# Patient Record
Sex: FEMALE | Race: WHITE | NOT HISPANIC OR LATINO | Employment: FULL TIME | URBAN - METROPOLITAN AREA
[De-identification: names, ages, dates, MRNs, and addresses within clinical notes are randomized per-mention and may not be internally consistent; named-entity substitution may affect disease eponyms.]

---

## 2017-01-23 ENCOUNTER — OFFICE VISIT (OUTPATIENT)
Dept: BARIATRICS/WEIGHT MGMT | Facility: CLINIC | Age: 47
End: 2017-01-23

## 2017-01-23 VITALS
HEIGHT: 65 IN | RESPIRATION RATE: 16 BRPM | BODY MASS INDEX: 42.32 KG/M2 | TEMPERATURE: 98.4 F | WEIGHT: 254 LBS | HEART RATE: 107 BPM | DIASTOLIC BLOOD PRESSURE: 92 MMHG | SYSTOLIC BLOOD PRESSURE: 140 MMHG

## 2017-01-23 DIAGNOSIS — Z71.3 DIETARY COUNSELING: ICD-10-CM

## 2017-01-23 DIAGNOSIS — R60.9 EDEMA, UNSPECIFIED TYPE: ICD-10-CM

## 2017-01-23 DIAGNOSIS — R53.82 CHRONIC FATIGUE: ICD-10-CM

## 2017-01-23 DIAGNOSIS — G47.33 OSA (OBSTRUCTIVE SLEEP APNEA): ICD-10-CM

## 2017-01-23 DIAGNOSIS — K21.9 GASTROESOPHAGEAL REFLUX DISEASE, ESOPHAGITIS PRESENCE NOT SPECIFIED: ICD-10-CM

## 2017-01-23 DIAGNOSIS — Z72.0 TOBACCO ABUSE: ICD-10-CM

## 2017-01-23 DIAGNOSIS — E66.01 OBESITY, CLASS III, BMI 40-49.9 (MORBID OBESITY) (HCC): Primary | ICD-10-CM

## 2017-01-23 DIAGNOSIS — M25.50 MULTIPLE JOINT PAIN: ICD-10-CM

## 2017-01-23 PROCEDURE — 99212 OFFICE O/P EST SF 10 MIN: CPT | Performed by: NURSE PRACTITIONER

## 2017-01-23 NOTE — PROGRESS NOTES
[facility] BARIATRIC SURGERY  [address]  [facility] [address]  [address]  [facility] [address]  Dept: [phone]  1/23/2017      Leena Mims.  [id number]  [id number]  1970  female      Chief Complaint   Patient presents with   • Obesity     Diet management 6/9       The patient is here for month 6 of their pre-operative physician supervised diet. She had a loss of 6 lbs. The patient states that she is following the recommendations given by our office and dietician including a high lean protein, low carb and low fat diet. We recommended adequate fruits and vegetable intake along with limited portion sizes. Patient is working on eliminating fast foods, fried foods, sweets and soda. Leena Mims has been increasing her daily water intake. She has been exercising: walking in the evening.    Patient states they have made positive changes including cut bread completely out, drinking less coffee and more water, not skipping meals, eating some lean protein/vegetables/fruit, cut back on potatoes/chips/poor snack choices, trying to limit snacking  The patient admits to be struggling with figuring out lunch options that isn't using bread    Review of Systems   Musculoskeletal: Positive for arthralgias.   All other systems reviewed and are negative.    Vitals:    01/23/17 1336   BP: 140/92   Pulse: 107   Resp: 16   Temp: 98.4 °F (36.9 °C)     Patient Active Problem List   Diagnosis   • Obesity, Class III, BMI 40-49.9 (morbid obesity)   • SULEMA (obstructive sleep apnea)   • GERD (gastroesophageal reflux disease)   • Chronic fatigue   • Edema   • Gastric ulcer   • Multiple joint pain   • Depression with anxiety   • Asthma   • Tobacco abuse   • Dietary counseling   • IBS (irritable bowel syndrome)       The following portions of the patient's history were reviewed and updated as appropriate: active problem list, medication list, allergies, social  history, notes from last encounter    Physical Exam   Constitutional: She is oriented to person, place, and time. She appears well-developed and well-nourished.   HENT:   Head: Normocephalic and atraumatic.   Eyes: EOM are normal.   Cardiovascular: Normal rate.    Pulmonary/Chest: Effort normal.   Musculoskeletal: Normal range of motion.   Neurological: She is alert and oriented to person, place, and time.   Skin: Skin is warm and dry.   Psychiatric: She has a normal mood and affect. Her behavior is normal. Judgment and thought content normal.   Vitals reviewed.      Discussion/Plan:  Obesity/Morbid Obesity: Currently the patient's weight is decreasing. There are no medications prescribed.Treatment plan includes prescribed diet, prescribed exercise regimen and behavior modification.    I reviewed the appropriate dietary choices with the patient and encouraged the necessary changes. Recommended at least 70 grams of protein per day, around 35 grams of fats and less than 100 grams of carbohydrates. Reviewed calorie intake if patient wanted to calorie count and/or had BMR. Instructed patient to drink half of body weight in ounces per day and exercise a minimum of 150 minutes per week including both cardio and strength training. Discussed the option of keeping a food journal which will help patient become more aware of the nutritional value of foods so they are more prepared after surgery.    Patient is down to 1/2ppd but just bought a e-cig to help be able to stop smoking.     The patient was given written materials from our office for education.   I answered all of the patients questions regarding dietary changes, exercise or surgical options.  The patient will follow up in 1 month. The total time spent face to face was 12 minutes with more than half our time was spent counseling.

## 2017-01-23 NOTE — MR AVS SNAPSHOT
Leena Mims   1/23/2017 1:30 PM   Office Visit    Dept Phone:  [phone]   Encounter #:  [id number]    Provider:  ROXY Campbell   Department:  [facility] BARIATRIC SURGERY                Your Full Care Plan              Today's Medication Changes          These changes are accurate as of: 1/23/17  2:10 PM.  If you have any questions, ask your nurse or doctor.               Stop taking medication(s)listed here:     TRAMADOL HCL PO                      Your Updated Medication List          This list is accurate as of: 1/23/17  2:10 PM.  Always use your most recent med list.                ALBUTEROL IN       budesonide-formoterol 160-4.5 MCG/ACT inhaler   Commonly known as:  SYMBICORT       cyclobenzaprine 10 MG tablet   Commonly known as:  FLEXERIL       dicyclomine 10 MG capsule   Commonly known as:  BENTYL       gabapentin 300 MG capsule   Commonly known as:  NEURONTIN       IBUPROFEN PO       montelukast 10 MG tablet   Commonly known as:  SINGULAIR       naproxen sodium 220 MG tablet   Commonly known as:  ALEVE       omeprazole 20 MG capsule   Commonly known as:  priLOSEC   Take 1 capsule by mouth Daily.       OXAPROZIN PO               You Were Diagnosed With        Codes Comments    Obesity, Class III, BMI 40-49.9 (morbid obesity)    -  Primary ICD-10-CM: E66.01  ICD-9-CM: 278.01     Dietary counseling     ICD-10-CM: Z71.3  ICD-9-CM: V65.3     SULEMA (obstructive sleep apnea)     ICD-10-CM: G47.33  ICD-9-CM: 327.23     Gastroesophageal reflux disease, esophagitis presence not specified     ICD-10-CM: K21.9  ICD-9-CM: 530.81     Multiple joint pain     ICD-10-CM: M25.50  ICD-9-CM: 719.49     Chronic fatigue     ICD-10-CM: R53.82  ICD-9-CM: 780.79     Edema, unspecified type     ICD-10-CM: R60.9  ICD-9-CM: 782.3     Tobacco abuse     ICD-10-CM: Z72.0  ICD-9-CM: 305.1       Instructions    Recommended patient be sure to get at least 70 grams of protein per day by  "eating small, frequent meals all with high lean protein choices. Be sure to limit/cut back on daily carbohydrate intake. Discussed with the patient the recommended amount of water per day to intake. Reviewed vitamin requirements. Be sure to do routine exercise including both cardio and strength training.        Patient Instructions History      Upcoming Appointments     Visit Type Date Time Department    FOLLOW UP 1/23/2017  1:30 PM [facility]      MyChart Signup     Our records indicate that you have declined [facility] signup. If you would like to sign up for [employer], please email [email or web address] or call [phone] to obtain an activation code.             Other Info from Your Visit           Allergies     Aspirin      Celebrex [Celecoxib]      Contrast Dye      Penicillins        Reason for Visit     Obesity Diet management 6/9      Vital Signs     Blood Pressure Pulse Temperature Respirations Height Weight    140/92 107 98.4 °F (36.9 °C) (Oral) 16 65.25\" (165.7 cm) 254 lb (115 kg)    Body Mass Index Smoking Status                41.94 kg/m2 Current Every Day Smoker          Problems and Diagnoses Noted     Chronic fatigue    Dietary counseling    Accumulation of fluid in tissues    Acid reflux disease    Multiple joint pain    Obesity, Class III, BMI 40-49.9 (morbid obesity)    SULEMA (obstructive sleep apnea)    Tobacco abuse        "

## 2017-02-23 ENCOUNTER — OFFICE VISIT (OUTPATIENT)
Dept: BARIATRICS/WEIGHT MGMT | Facility: CLINIC | Age: 47
End: 2017-02-23

## 2017-02-23 VITALS
SYSTOLIC BLOOD PRESSURE: 155 MMHG | HEART RATE: 87 BPM | DIASTOLIC BLOOD PRESSURE: 106 MMHG | RESPIRATION RATE: 16 BRPM | BODY MASS INDEX: 43.49 KG/M2 | WEIGHT: 261 LBS | HEIGHT: 65 IN | TEMPERATURE: 98.4 F

## 2017-02-23 DIAGNOSIS — Z71.3 DIETARY COUNSELING: ICD-10-CM

## 2017-02-23 DIAGNOSIS — K21.9 GASTROESOPHAGEAL REFLUX DISEASE, ESOPHAGITIS PRESENCE NOT SPECIFIED: ICD-10-CM

## 2017-02-23 DIAGNOSIS — E66.01 OBESITY, CLASS III, BMI 40-49.9 (MORBID OBESITY) (HCC): Primary | ICD-10-CM

## 2017-02-23 DIAGNOSIS — M25.50 MULTIPLE JOINT PAIN: ICD-10-CM

## 2017-02-23 DIAGNOSIS — G47.33 OSA (OBSTRUCTIVE SLEEP APNEA): ICD-10-CM

## 2017-02-23 DIAGNOSIS — R53.82 CHRONIC FATIGUE: ICD-10-CM

## 2017-02-23 PROCEDURE — 99213 OFFICE O/P EST LOW 20 MIN: CPT | Performed by: NURSE PRACTITIONER

## 2017-02-23 NOTE — PROGRESS NOTES
[facility] BARIATRIC SURGERY  [address]  [facility] [address]  [address]  [facility] [address]  Dept: [phone]  2/23/2017      Leena Mims.  [id number]  [id number]  1970  female      Chief Complaint   Patient presents with   • Obesity     Diet management 7/9       The patient is here for month 7 of their pre-operative physician supervised diet. She had a gain of 7 lbs. The patient states that she is following the recommendations given by our office and dietician including a high lean protein, low carb and low fat diet. We recommended adequate fruits and vegetable intake along with limited portion sizes. Patient is working on eliminating fast foods, fried foods, sweets and soda. Leena Mims has been increasing her daily water intake. She has been exercising: walking often at work but not doing other routine exercise between working and taking care of family member in the hospital.    Patient states they have made positive changes including drinking plenty of water and cut way back on coffee, quit smoking and switched to the e-cigarette (two days ago), not skipping meals and eating breakfast, eating more vegetables, cut back on the bread  The patient admits to be struggling with eating out of convenience for having to drive back and forth to the hospital so going through the drive through    Review of Systems   All other systems reviewed and are negative.    Vitals:    02/23/17 0828   BP: (!) 155/106   Pulse: 87   Resp: 16   Temp: 98.4 °F (36.9 °C)     Patient Active Problem List   Diagnosis   • Obesity, Class III, BMI 40-49.9 (morbid obesity)   • SULEMA (obstructive sleep apnea)   • GERD (gastroesophageal reflux disease)   • Chronic fatigue   • Edema   • Gastric ulcer   • Multiple joint pain   • Depression with anxiety   • Asthma   • Tobacco abuse   • Dietary counseling   • IBS (irritable bowel syndrome)       The following portions of  the patient's history were reviewed and updated as appropriate: active problem list, medication list, allergies, social history, notes from last encounter    Physical Exam   Constitutional: She is oriented to person, place, and time. She appears well-developed and well-nourished.   HENT:   Head: Normocephalic and atraumatic.   Eyes: EOM are normal.   Cardiovascular: Normal rate.    Pulmonary/Chest: Effort normal.   Musculoskeletal: Normal range of motion.   Neurological: She is alert and oriented to person, place, and time.   Skin: Skin is warm and dry.   Psychiatric: She has a normal mood and affect. Her behavior is normal. Judgment and thought content normal.   Vitals reviewed.      Will follow up on her blood pressure  Discussion/Plan:  Obesity/Morbid Obesity: Currently the patient's weight is increasing. There are no medications prescribed.Treatment plan includes prescribed diet, prescribed exercise regimen and behavior modification.    I reviewed the appropriate dietary choices with the patient and encouraged the necessary changes. Recommended at least 70 grams of protein per day, around 35 grams of fats and less than 100 grams of carbohydrates. Reviewed calorie intake if patient wanted to calorie count and/or had BMR. Instructed patient to drink half of body weight in ounces per day and exercise a minimum of 150 minutes per week including both cardio and strength training. Discussed the option of keeping a food journal which will help patient become more aware of the nutritional value of foods so they are more prepared after surgery.    The patient was given written materials from our office for education.   I answered all of the patients questions regarding dietary changes, exercise or surgical options.  The patient will follow up in 1 month. The total time spent face to face was 15 minutes with more than half our time was spent counseling.

## 2017-03-21 ENCOUNTER — OFFICE VISIT (OUTPATIENT)
Dept: BARIATRICS/WEIGHT MGMT | Facility: CLINIC | Age: 47
End: 2017-03-21

## 2017-03-21 VITALS
WEIGHT: 258 LBS | DIASTOLIC BLOOD PRESSURE: 98 MMHG | RESPIRATION RATE: 16 BRPM | HEART RATE: 87 BPM | SYSTOLIC BLOOD PRESSURE: 156 MMHG | TEMPERATURE: 98.4 F | BODY MASS INDEX: 42.99 KG/M2 | HEIGHT: 65 IN

## 2017-03-21 DIAGNOSIS — M25.50 MULTIPLE JOINT PAIN: ICD-10-CM

## 2017-03-21 DIAGNOSIS — K21.9 GASTROESOPHAGEAL REFLUX DISEASE, ESOPHAGITIS PRESENCE NOT SPECIFIED: ICD-10-CM

## 2017-03-21 DIAGNOSIS — R60.9 EDEMA, UNSPECIFIED TYPE: ICD-10-CM

## 2017-03-21 DIAGNOSIS — E66.01 OBESITY, CLASS III, BMI 40-49.9 (MORBID OBESITY) (HCC): Primary | ICD-10-CM

## 2017-03-21 DIAGNOSIS — G47.33 OSA (OBSTRUCTIVE SLEEP APNEA): ICD-10-CM

## 2017-03-21 PROCEDURE — 99213 OFFICE O/P EST LOW 20 MIN: CPT | Performed by: NURSE PRACTITIONER

## 2017-03-21 NOTE — PROGRESS NOTES
[facility] BARIATRIC SURGERY  [address]  [facility] [address]  [address]  [facility] [address]  Dept: [phone]  3/21/2017      Leena Mims.  [id number]  [id number]  1970  female      Chief Complaint   Patient presents with   • Obesity     Diet manangement 8/9       The patient is here for month 8/9 of their pre-operative physician supervised diet. She had a loss of 3 lbs. The patient states that she is following the recommendations given by our office and dietician including a high lean protein, low carb and low fat diet. We recommended adequate fruits and vegetable intake along with limited portion sizes. Patient is working on eliminating fast foods, fried foods, sweets and soda. Leena Mims has been increasing her daily water intake. She has been exercising: walking 5 days per week. Reports getting protein with every meal, likes peanut butter, likes protein bars, and meat.     Patient states they have made positive changes including walking, increased water intake, really cut back on coffee from 3 pots per day to three cups per day.  The patient admits to be struggling with snacking and grazing. Reports traveling a lot and struggling with finding low carb snacks throughout the day.     Review of Systems   All other systems reviewed and are negative.    Vitals:    03/21/17 1155   BP: 156/98   Pulse: 87   Resp: 16   Temp: 98.4 °F (36.9 °C)     Patient Active Problem List   Diagnosis   • Obesity, Class III, BMI 40-49.9 (morbid obesity)   • SULEMA (obstructive sleep apnea)   • GERD (gastroesophageal reflux disease)   • Chronic fatigue   • Edema   • Multiple joint pain   • Depression with anxiety   • Asthma   • Tobacco abuse   • Dietary counseling   • IBS (irritable bowel syndrome)       The following portions of the patient's history were reviewed and updated as appropriate: active problem list, medication list, allergies, health  maintenance, notes from last encounter    Physical Exam   Constitutional: She appears well-developed and well-nourished.   Neck: No thyromegaly present.   Cardiovascular: Normal rate, regular rhythm and normal heart sounds.    No murmur heard.  Pulmonary/Chest: Effort normal and breath sounds normal. No respiratory distress. She has no wheezes.   Abdominal: Soft. Bowel sounds are normal. She exhibits no distension. There is no tenderness. There is no guarding. No hernia.   Musculoskeletal: She exhibits no edema or tenderness.   Neurological: She is alert.   Skin: Skin is warm and dry. No rash noted. No erythema.   Psychiatric: She has a normal mood and affect. Her behavior is normal.   Nursing note and vitals reviewed.      Discussion/Plan:  Obesity/Morbid Obesity: Currently the patient's weight is decreasing. There are no medications prescribed.Treatment plan includes prescribed diet, prescribed exercise regimen and behavior modification.    I reviewed the appropriate dietary choices with the patient and encouraged the necessary changes. Recommended at least 70 grams of protein per day, around 35 grams of fats and less than 100 grams of carbohydrates. Reviewed calorie intake if patient wanted to calorie count and/or had BMR. Instructed patient to drink half of body weight in ounces per day and exercise a minimum of 150 minutes per week including both cardio and strength training. Discussed the option of keeping a food journal which will help patient become more aware of the nutritional value of foods so they are more prepared after surgery.     Patient encouraged to increase protein intake by making sure she is getting a protein with her snacks. Encouraged to keep a cooler in her car stocked with protein rich and nutrient dense snacks for throughout the day. Patient encouraged to keep up the good work regarding exercise, and water intake. Patient's checklist of things needed to submit to insurance before consult  reviewed. Patient instructed to report to outpatient registration to have ECG and CXR done and given a copy of authorization from primary care provider for bariatric surgery to get completed and return to our office.     The patient was given written materials from our office for education.   I answered all of the patients questions regarding dietary changes, exercise or surgical options.  The patient will follow up in 1 month. The total time spent face to face was 14 minutes with more than half our time was spent counseling.

## 2017-04-24 ENCOUNTER — APPOINTMENT (OUTPATIENT)
Dept: MAMMOGRAPHY | Facility: HOSPITAL | Age: 47
End: 2017-04-24

## 2017-04-24 ENCOUNTER — TRANSCRIBE ORDERS (OUTPATIENT)
Dept: ADMINISTRATIVE | Facility: HOSPITAL | Age: 47
End: 2017-04-24

## 2017-04-24 ENCOUNTER — TRANSCRIBE ORDERS (OUTPATIENT)
Dept: LAB | Facility: HOSPITAL | Age: 47
End: 2017-04-24

## 2017-04-24 ENCOUNTER — LAB (OUTPATIENT)
Dept: LAB | Facility: HOSPITAL | Age: 47
End: 2017-04-24

## 2017-04-24 ENCOUNTER — HOSPITAL ENCOUNTER (OUTPATIENT)
Dept: MAMMOGRAPHY | Facility: HOSPITAL | Age: 47
Discharge: HOME | End: 2017-04-24
Admitting: FAMILY MEDICINE

## 2017-04-24 DIAGNOSIS — Z12.31 VISIT FOR SCREENING MAMMOGRAM: ICD-10-CM

## 2017-04-24 DIAGNOSIS — Z00.00 REGULAR CHECK-UP: ICD-10-CM

## 2017-04-24 DIAGNOSIS — Z12.31 VISIT FOR SCREENING MAMMOGRAM: Primary | ICD-10-CM

## 2017-04-24 DIAGNOSIS — Z00.00 REGULAR CHECK-UP: Primary | ICD-10-CM

## 2017-04-24 LAB
ALBUMIN SERPL-MCNC: 4.3 G/DL (ref 3.2–4.8)
ALBUMIN/GLOB SERPL: 1.4 G/DL (ref 1.5–2.5)
ALP SERPL-CCNC: 99 U/L (ref 25–100)
ALT SERPL W P-5'-P-CCNC: 19 U/L (ref 7–40)
ANION GAP SERPL CALCULATED.3IONS-SCNC: 3 MMOL/L (ref 3–11)
ARTICHOKE IGE QN: 117 MG/DL (ref 0–130)
AST SERPL-CCNC: 21 U/L (ref 0–33)
BILIRUB SERPL-MCNC: 0.2 MG/DL (ref 0.3–1.2)
BUN BLD-MCNC: 14 MG/DL (ref 9–23)
BUN/CREAT SERPL: 28 (ref 7–25)
CALCIUM SPEC-SCNC: 10.1 MG/DL (ref 8.7–10.4)
CHLORIDE SERPL-SCNC: 103 MMOL/L (ref 99–109)
CHOLEST SERPL-MCNC: 195 MG/DL (ref 0–200)
CO2 SERPL-SCNC: 32 MMOL/L (ref 20–31)
CREAT BLD-MCNC: 0.5 MG/DL (ref 0.6–1.3)
GFR SERPL CREATININE-BSD FRML MDRD: 133 ML/MIN/1.73
GLOBULIN UR ELPH-MCNC: 3 GM/DL
GLUCOSE BLD-MCNC: 91 MG/DL (ref 70–100)
HDLC SERPL-MCNC: 49 MG/DL (ref 40–60)
POTASSIUM BLD-SCNC: 4.3 MMOL/L (ref 3.5–5.5)
PROT SERPL-MCNC: 7.3 G/DL (ref 5.7–8.2)
SODIUM BLD-SCNC: 138 MMOL/L (ref 132–146)
TRIGL SERPL-MCNC: 172 MG/DL (ref 0–150)

## 2017-04-24 PROCEDURE — 77067 SCR MAMMO BI INCL CAD: CPT | Performed by: RADIOLOGY

## 2017-04-24 PROCEDURE — 36415 COLL VENOUS BLD VENIPUNCTURE: CPT

## 2017-04-24 PROCEDURE — G0202 SCR MAMMO BI INCL CAD: HCPCS

## 2017-04-24 PROCEDURE — 77063 BREAST TOMOSYNTHESIS BI: CPT

## 2017-04-24 PROCEDURE — 80061 LIPID PANEL: CPT | Performed by: FAMILY MEDICINE

## 2017-04-24 PROCEDURE — 80053 COMPREHEN METABOLIC PANEL: CPT | Performed by: FAMILY MEDICINE

## 2017-04-24 PROCEDURE — 77063 BREAST TOMOSYNTHESIS BI: CPT | Performed by: RADIOLOGY

## 2017-04-25 ENCOUNTER — TRANSCRIBE ORDERS (OUTPATIENT)
Dept: CARDIOLOGY | Facility: HOSPITAL | Age: 47
End: 2017-04-25

## 2017-04-25 ENCOUNTER — HOSPITAL ENCOUNTER (OUTPATIENT)
Dept: CARDIOLOGY | Facility: HOSPITAL | Age: 47
Discharge: HOME | End: 2017-04-25
Admitting: NURSE PRACTITIONER

## 2017-04-25 ENCOUNTER — OFFICE VISIT (OUTPATIENT)
Dept: BARIATRICS/WEIGHT MGMT | Facility: CLINIC | Age: 47
End: 2017-04-25

## 2017-04-25 VITALS
HEART RATE: 88 BPM | RESPIRATION RATE: 16 BRPM | WEIGHT: 255 LBS | DIASTOLIC BLOOD PRESSURE: 90 MMHG | HEIGHT: 65 IN | TEMPERATURE: 98.2 F | SYSTOLIC BLOOD PRESSURE: 147 MMHG | BODY MASS INDEX: 42.49 KG/M2

## 2017-04-25 DIAGNOSIS — F41.8 DEPRESSION WITH ANXIETY: ICD-10-CM

## 2017-04-25 DIAGNOSIS — G47.33 OSA (OBSTRUCTIVE SLEEP APNEA): ICD-10-CM

## 2017-04-25 DIAGNOSIS — K21.9 GASTROESOPHAGEAL REFLUX DISEASE, ESOPHAGITIS PRESENCE NOT SPECIFIED: ICD-10-CM

## 2017-04-25 DIAGNOSIS — J45.30 MILD PERSISTENT ASTHMA WITHOUT COMPLICATION: ICD-10-CM

## 2017-04-25 DIAGNOSIS — R53.82 CHRONIC FATIGUE: ICD-10-CM

## 2017-04-25 DIAGNOSIS — Z71.3 DIETARY COUNSELING: ICD-10-CM

## 2017-04-25 DIAGNOSIS — E66.01 OBESITY, CLASS III, BMI 40-49.9 (MORBID OBESITY) (HCC): ICD-10-CM

## 2017-04-25 DIAGNOSIS — K58.9 IRRITABLE BOWEL SYNDROME WITHOUT DIARRHEA: ICD-10-CM

## 2017-04-25 DIAGNOSIS — R60.9 EDEMA, UNSPECIFIED TYPE: ICD-10-CM

## 2017-04-25 DIAGNOSIS — J42 CHRONIC BRONCHITIS, UNSPECIFIED CHRONIC BRONCHITIS TYPE (HCC): ICD-10-CM

## 2017-04-25 DIAGNOSIS — E66.01 OBESITY, CLASS III, BMI 40-49.9 (MORBID OBESITY) (HCC): Primary | ICD-10-CM

## 2017-04-25 PROCEDURE — 93005 ELECTROCARDIOGRAM TRACING: CPT | Performed by: NURSE PRACTITIONER

## 2017-04-25 PROCEDURE — 93010 ELECTROCARDIOGRAM REPORT: CPT | Performed by: INTERNAL MEDICINE

## 2017-04-25 PROCEDURE — 99213 OFFICE O/P EST LOW 20 MIN: CPT | Performed by: NURSE PRACTITIONER

## 2017-04-25 NOTE — PROGRESS NOTES
[facility] BARIATRIC SURGERY  [address]  [facility] [id number] [phone]  [address]  [facility] [address] [phone]  Dept: [phone]  4/25/2017      Leena Mims.  [id number]  [id number]  1970  female      Chief Complaint   Patient presents with   • Obesity     Diet management 9/9       The patient is here for month  of their pre-operative physician supervised diet. She had a loss of 3 lbs. The patient states that she is following the recommendations given by our office and dietician including a high lean protein, low carb and low fat diet. We recommended adequate fruits and vegetable intake along with limited portion sizes. Patient is working on eliminating fast foods, fried foods, sweets and soda. Leena Mims has been increasing her daily water intake. She has been exercising: situps and walking every day.    Patient states they have made positive changes including reports doing protein shakes, reports eating meats, reports cutting back on carbs. Reports that she quit smoking for almost 4 weeks in March, reports that she quit again on April 1st and hasn't smoked since.   The patient admits to be struggling with craving carbs and cutting out coffee although she has cut down a whole lot. Patient concerned about her PCP's response to her request to have bariatric surgery consent signed. Reports that her PCP advised her to have a sleeve gastrectomy instead of having gastric bypass and advised the patient to see another bariatric surgeon for a second opinion related to her candidacy.      Review of Systems   Constitutional: Positive for appetite change. Negative for fatigue and unexpected weight change.   HENT: Negative.    Eyes: Negative.    Respiratory: Negative.    Cardiovascular: Negative.  Negative for leg swelling.   Gastrointestinal: Negative for abdominal distention, abdominal pain, constipation, diarrhea, nausea and  vomiting.   Genitourinary: Negative for difficulty urinating, frequency and urgency.   Musculoskeletal: Negative for back pain.   Skin: Negative.    Psychiatric/Behavioral: Negative.    All other systems reviewed and are negative.    Vitals:    04/25/17 1331   BP: 147/90   Pulse: 88   Resp: 16   Temp: 98.2 °F (36.8 °C)     Patient Active Problem List   Diagnosis   • Obesity, Class III, BMI 40-49.9 (morbid obesity)   • SULEMA (obstructive sleep apnea)   • GERD (gastroesophageal reflux disease)   • Chronic fatigue   • Edema   • Multiple joint pain   • Depression with anxiety   • Asthma   • Tobacco abuse   • Dietary counseling   • IBS (irritable bowel syndrome)       The following portions of the patient's history were reviewed and updated as appropriate: active problem list, medication list, family history, health maintenance, notes from last encounter    Physical Exam   Constitutional: She is oriented to person, place, and time. She appears well-developed and well-nourished.   Neck: No thyromegaly present.   Cardiovascular: Normal rate, regular rhythm and normal heart sounds.    No murmur heard.  Pulmonary/Chest: Effort normal and breath sounds normal. No respiratory distress. She has no wheezes.   Abdominal: Soft. Bowel sounds are normal. She exhibits no distension. There is no tenderness. There is no guarding. No hernia.   Musculoskeletal: She exhibits no edema or tenderness.   Neurological: She is alert and oriented to person, place, and time.   Skin: Skin is warm and dry. No rash noted. No erythema.   Psychiatric: She has a normal mood and affect. Her behavior is normal.   Nursing note and vitals reviewed.      Discussion/Plan:  Obesity/Morbid Obesity: Currently the patient's weight is decreasing. There are no medications prescribed.Treatment plan includes prescribed diet, prescribed exercise regimen and behavior modification.    I reviewed the appropriate dietary choices with the patient and encouraged the  necessary changes. Recommended at least 70 grams of protein per day, around 35 grams of fats and less than 100 grams of carbohydrates. Reviewed calorie intake if patient wanted to calorie count and/or had BMR. Instructed patient to drink half of body weight in ounces per day and exercise a minimum of 150 minutes per week including both cardio and strength training. Discussed the option of keeping a food journal which will help patient become more aware of the nutritional value of foods so they are more prepared after surgery.    Patient assured that her EKG, EGD, and CXR have been received. Advised to continue to talk to PCP and have a discussion related to her concerns. Patient counseled on smoking cessation to this point and encouraged to keep up the good work. Patient verbalized understanding that she will need to have a nicotine test done prior to surgery to verify smoking cessation and that she must be done with smoking for 3 months prior to surgery.     The patient was given written materials from our office for education.   I answered all of the patients questions regarding dietary changes, exercise or surgical options. The total time spent face to face was 14 minutes with more than half our time was spent counseling.

## 2017-04-28 PROBLEM — J42 CHRONIC BRONCHITIS (HCC): Status: ACTIVE | Noted: 2017-04-28

## 2017-05-03 DIAGNOSIS — Z01.818 PRE-OP EVALUATION: ICD-10-CM

## 2017-05-03 DIAGNOSIS — G47.33 OSA (OBSTRUCTIVE SLEEP APNEA): ICD-10-CM

## 2017-05-03 DIAGNOSIS — R60.9 EDEMA, UNSPECIFIED TYPE: ICD-10-CM

## 2017-05-03 DIAGNOSIS — E66.01 OBESITY, CLASS III, BMI 40-49.9 (MORBID OBESITY) (HCC): Primary | ICD-10-CM

## 2017-05-04 ENCOUNTER — HOSPITAL ENCOUNTER (OUTPATIENT)
Dept: MAMMOGRAPHY | Facility: HOSPITAL | Age: 47
Discharge: HOME | End: 2017-05-04
Admitting: FAMILY MEDICINE

## 2017-05-04 ENCOUNTER — TRANSCRIBE ORDERS (OUTPATIENT)
Dept: MAMMOGRAPHY | Facility: HOSPITAL | Age: 47
End: 2017-05-04

## 2017-05-04 ENCOUNTER — HOSPITAL ENCOUNTER (OUTPATIENT)
Dept: ULTRASOUND IMAGING | Facility: HOSPITAL | Age: 47
Discharge: HOME | End: 2017-05-04

## 2017-05-04 DIAGNOSIS — R92.8 ABNORMAL MAMMOGRAM: ICD-10-CM

## 2017-05-04 DIAGNOSIS — R92.8 ABNORMAL MAMMOGRAM: Primary | ICD-10-CM

## 2017-05-04 PROCEDURE — 77065 DX MAMMO INCL CAD UNI: CPT | Performed by: RADIOLOGY

## 2017-05-04 PROCEDURE — G0206 DX MAMMO INCL CAD UNI: HCPCS

## 2017-05-04 PROCEDURE — 76642 ULTRASOUND BREAST LIMITED: CPT | Performed by: RADIOLOGY

## 2017-05-04 PROCEDURE — 76642 ULTRASOUND BREAST LIMITED: CPT

## 2017-05-04 PROCEDURE — G0279 TOMOSYNTHESIS, MAMMO: HCPCS

## 2017-05-04 PROCEDURE — 77061 BREAST TOMOSYNTHESIS UNI: CPT | Performed by: RADIOLOGY

## 2017-05-10 RX ORDER — OMEPRAZOLE 20 MG/1
CAPSULE, DELAYED RELEASE ORAL
Qty: 30 CAPSULE | Refills: 3 | Status: SHIPPED | OUTPATIENT
Start: 2017-05-10 | End: 2017-12-04

## 2017-05-11 ENCOUNTER — HOSPITAL ENCOUNTER (OUTPATIENT)
Dept: CARDIOLOGY | Facility: HOSPITAL | Age: 47
Discharge: HOME | End: 2017-05-11
Attending: INTERNAL MEDICINE | Admitting: INTERNAL MEDICINE

## 2017-05-11 ENCOUNTER — HOSPITAL ENCOUNTER (OUTPATIENT)
Dept: CARDIOLOGY | Facility: HOSPITAL | Age: 47
Discharge: HOME | End: 2017-05-11
Attending: INTERNAL MEDICINE

## 2017-05-11 ENCOUNTER — OFFICE VISIT (OUTPATIENT)
Dept: CARDIOLOGY | Facility: CLINIC | Age: 47
End: 2017-05-11

## 2017-05-11 ENCOUNTER — TELEPHONE (OUTPATIENT)
Dept: CARDIOLOGY | Facility: CLINIC | Age: 47
End: 2017-05-11

## 2017-05-11 VITALS
DIASTOLIC BLOOD PRESSURE: 85 MMHG | SYSTOLIC BLOOD PRESSURE: 133 MMHG | HEART RATE: 83 BPM | BODY MASS INDEX: 42.44 KG/M2 | WEIGHT: 257 LBS

## 2017-05-11 VITALS — BODY MASS INDEX: 42.77 KG/M2 | HEIGHT: 65 IN

## 2017-05-11 VITALS — DIASTOLIC BLOOD PRESSURE: 65 MMHG | HEART RATE: 80 BPM | SYSTOLIC BLOOD PRESSURE: 122 MMHG

## 2017-05-11 DIAGNOSIS — Z01.818 PREOPERATIVE EVALUATION OF A MEDICAL CONDITION TO RULE OUT SURGICAL CONTRAINDICATIONS (TAR REQUIRED): ICD-10-CM

## 2017-05-11 DIAGNOSIS — J42 CHRONIC BRONCHITIS, UNSPECIFIED CHRONIC BRONCHITIS TYPE (HCC): ICD-10-CM

## 2017-05-11 DIAGNOSIS — E66.01 OBESITY, CLASS III, BMI 40-49.9 (MORBID OBESITY) (HCC): ICD-10-CM

## 2017-05-11 DIAGNOSIS — G47.33 OSA (OBSTRUCTIVE SLEEP APNEA): ICD-10-CM

## 2017-05-11 DIAGNOSIS — G47.33 OSA (OBSTRUCTIVE SLEEP APNEA): Primary | ICD-10-CM

## 2017-05-11 LAB
BH CV STRESS BP STAGE 1: NORMAL
BH CV STRESS BP STAGE 2: NORMAL
BH CV STRESS DURATION MIN STAGE 1: 3
BH CV STRESS DURATION MIN STAGE 2: 1
BH CV STRESS DURATION SEC STAGE 1: 0
BH CV STRESS DURATION SEC STAGE 2: 10
BH CV STRESS GRADE STAGE 1: 10
BH CV STRESS GRADE STAGE 2: 12
BH CV STRESS HR STAGE 1: 148
BH CV STRESS HR STAGE 2: 158
BH CV STRESS METS STAGE 1: 4.6
BH CV STRESS METS STAGE 2: 5.5
BH CV STRESS PROTOCOL 1: NORMAL
BH CV STRESS RECOVERY BP: NORMAL MMHG
BH CV STRESS RECOVERY HR: 92 BPM
BH CV STRESS SPEED STAGE 1: 1.7
BH CV STRESS SPEED STAGE 2: 2.5
BH CV STRESS STAGE 1: 1
BH CV STRESS STAGE 2: 2
MAXIMAL PREDICTED HEART RATE: 175 BPM
PERCENT MAX PREDICTED HR: 90.29 %
STRESS BASELINE BP: NORMAL MMHG
STRESS BASELINE HR: 77 BPM
STRESS PERCENT HR: 106 %
STRESS POST ESTIMATED WORKLOAD: 5.7 METS
STRESS POST EXERCISE DUR MIN: 4 MIN
STRESS POST EXERCISE DUR SEC: 10 SEC
STRESS POST PEAK BP: NORMAL MMHG
STRESS POST PEAK HR: 158 BPM
STRESS TARGET HR: 149 BPM

## 2017-05-11 PROCEDURE — 93018 CV STRESS TEST I&R ONLY: CPT | Performed by: INTERNAL MEDICINE

## 2017-05-11 PROCEDURE — 0399T HC MYOCARDL STRAIN IMAG QUAN ASSMT PER SESS: CPT

## 2017-05-11 PROCEDURE — 93306 TTE W/DOPPLER COMPLETE: CPT

## 2017-05-11 PROCEDURE — 93016 CV STRESS TEST SUPVJ ONLY: CPT | Performed by: INTERNAL MEDICINE

## 2017-05-11 PROCEDURE — 99214 OFFICE O/P EST MOD 30 MIN: CPT | Performed by: INTERNAL MEDICINE

## 2017-05-11 PROCEDURE — 93017 CV STRESS TEST TRACING ONLY: CPT

## 2017-05-11 PROCEDURE — 93306 TTE W/DOPPLER COMPLETE: CPT | Performed by: INTERNAL MEDICINE

## 2017-05-11 PROCEDURE — 0399T ADULT TRANSTHORACIC ECHO COMPLETE: CPT | Performed by: INTERNAL MEDICINE

## 2017-05-11 PROCEDURE — 93000 ELECTROCARDIOGRAM COMPLETE: CPT | Performed by: INTERNAL MEDICINE

## 2017-05-19 LAB
BH CV ECHO MEAS - ACS: 1.4 CM
BH CV ECHO MEAS - AO MAX PG (FULL): 2.6 MMHG
BH CV ECHO MEAS - AO MAX PG: 13.2 MMHG
BH CV ECHO MEAS - AO MEAN PG (FULL): 1 MMHG
BH CV ECHO MEAS - AO MEAN PG: 7 MMHG
BH CV ECHO MEAS - AO ROOT AREA (BSA CORRECTED): 1.3
BH CV ECHO MEAS - AO ROOT AREA: 6.6 CM^2
BH CV ECHO MEAS - AO ROOT DIAM: 2.9 CM
BH CV ECHO MEAS - AO V2 MAX: 182 CM/SEC
BH CV ECHO MEAS - AO V2 MEAN: 126 CM/SEC
BH CV ECHO MEAS - AO V2 VTI: 37.7 CM
BH CV ECHO MEAS - AVA(I,A): 3 CM^2
BH CV ECHO MEAS - AVA(I,D): 3 CM^2
BH CV ECHO MEAS - AVA(V,A): 3.1 CM^2
BH CV ECHO MEAS - AVA(V,D): 3.1 CM^2
BH CV ECHO MEAS - BSA(HAYCOCK): 2.4 M^2
BH CV ECHO MEAS - BSA: 2.2 M^2
BH CV ECHO MEAS - BZI_BMI: 42.8 KILOGRAMS/M^2
BH CV ECHO MEAS - BZI_METRIC_HEIGHT: 165.1 CM
BH CV ECHO MEAS - BZI_METRIC_WEIGHT: 116.6 KG
BH CV ECHO MEAS - CONTRAST EF 4CH: 68.4 ML/M^2
BH CV ECHO MEAS - EDV(CUBED): 85.2 ML
BH CV ECHO MEAS - EDV(MOD-SP4): 38 ML
BH CV ECHO MEAS - EDV(TEICH): 87.7 ML
BH CV ECHO MEAS - EF(CUBED): 74.2 %
BH CV ECHO MEAS - EF(MOD-SP4): 68.4 %
BH CV ECHO MEAS - EF(TEICH): 66.3 %
BH CV ECHO MEAS - ESV(CUBED): 22 ML
BH CV ECHO MEAS - ESV(MOD-SP4): 12 ML
BH CV ECHO MEAS - ESV(TEICH): 29.6 ML
BH CV ECHO MEAS - FS: 36.4 %
BH CV ECHO MEAS - IVS/LVPW: 0.92
BH CV ECHO MEAS - IVSD: 1.1 CM
BH CV ECHO MEAS - LA DIMENSION: 3.8 CM
BH CV ECHO MEAS - LA/AO: 1.3
BH CV ECHO MEAS - LAT PEAK E' VEL: 12.9 CM/SEC
BH CV ECHO MEAS - LV DIASTOLIC VOL/BSA (35-75): 17.3 ML/M^2
BH CV ECHO MEAS - LV MASS(C)D: 180 GRAMS
BH CV ECHO MEAS - LV MASS(C)DI: 81.8 GRAMS/M^2
BH CV ECHO MEAS - LV MAX PG: 10.6 MMHG
BH CV ECHO MEAS - LV MEAN PG: 6 MMHG
BH CV ECHO MEAS - LV SYSTOLIC VOL/BSA (12-30): 5.5 ML/M^2
BH CV ECHO MEAS - LV V1 MAX: 163 CM/SEC
BH CV ECHO MEAS - LV V1 MEAN: 115 CM/SEC
BH CV ECHO MEAS - LV V1 VTI: 32.8 CM
BH CV ECHO MEAS - LVIDD: 4.4 CM
BH CV ECHO MEAS - LVIDS: 2.8 CM
BH CV ECHO MEAS - LVLD AP4: 7.2 CM
BH CV ECHO MEAS - LVLS AP4: 5.4 CM
BH CV ECHO MEAS - LVOT AREA (M): 3.5 CM^2
BH CV ECHO MEAS - LVOT AREA: 3.5 CM^2
BH CV ECHO MEAS - LVOT DIAM: 2.1 CM
BH CV ECHO MEAS - LVPWD: 1.2 CM
BH CV ECHO MEAS - MED PEAK E' VEL: 10.6 CM/SEC
BH CV ECHO MEAS - MV A DUR: 0.14 SEC
BH CV ECHO MEAS - MV A MAX VEL: 95.1 CM/SEC
BH CV ECHO MEAS - MV DEC SLOPE: 647 CM/SEC^2
BH CV ECHO MEAS - MV DEC TIME: 0.18 SEC
BH CV ECHO MEAS - MV E MAX VEL: 114 CM/SEC
BH CV ECHO MEAS - MV E/A: 1.2
BH CV ECHO MEAS - MV MAX PG: 7.6 MMHG
BH CV ECHO MEAS - MV MEAN PG: 3 MMHG
BH CV ECHO MEAS - MV P1/2T MAX VEL: 132 CM/SEC
BH CV ECHO MEAS - MV P1/2T: 59.8 MSEC
BH CV ECHO MEAS - MV V2 MAX: 138 CM/SEC
BH CV ECHO MEAS - MV V2 MEAN: 83.2 CM/SEC
BH CV ECHO MEAS - MV V2 VTI: 29.8 CM
BH CV ECHO MEAS - MVA P1/2T LCG: 1.7 CM^2
BH CV ECHO MEAS - MVA(P1/2T): 3.7 CM^2
BH CV ECHO MEAS - MVA(VTI): 3.8 CM^2
BH CV ECHO MEAS - PA MAX PG (FULL): 3.3 MMHG
BH CV ECHO MEAS - PA MAX PG: 5.1 MMHG
BH CV ECHO MEAS - PA V2 MAX: 113 CM/SEC
BH CV ECHO MEAS - PULM A REVS DUR: 0.13 SEC
BH CV ECHO MEAS - PULM A REVS VEL: 24.3 CM/SEC
BH CV ECHO MEAS - PULM DIAS VEL: 33.5 CM/SEC
BH CV ECHO MEAS - PULM S/D: 1.4
BH CV ECHO MEAS - PULM SYS VEL: 46.4 CM/SEC
BH CV ECHO MEAS - PVA(V,A): 1.7 CM^2
BH CV ECHO MEAS - PVA(V,D): 1.7 CM^2
BH CV ECHO MEAS - QP/QS: 0.38
BH CV ECHO MEAS - RAP SYSTOLE: 10 MMHG
BH CV ECHO MEAS - RV MAX PG: 1.8 MMHG
BH CV ECHO MEAS - RV MEAN PG: 1 MMHG
BH CV ECHO MEAS - RV V1 MAX: 67.2 CM/SEC
BH CV ECHO MEAS - RV V1 MEAN: 52.7 CM/SEC
BH CV ECHO MEAS - RV V1 VTI: 15.3 CM
BH CV ECHO MEAS - RVDD: 2.2 CM
BH CV ECHO MEAS - RVOT AREA: 2.8 CM^2
BH CV ECHO MEAS - RVOT DIAM: 1.9 CM
BH CV ECHO MEAS - RVSP: 26.2 MMHG
BH CV ECHO MEAS - SI(AO): 113.2 ML/M^2
BH CV ECHO MEAS - SI(CUBED): 28.7 ML/M^2
BH CV ECHO MEAS - SI(LVOT): 51.6 ML/M^2
BH CV ECHO MEAS - SI(MOD-SP4): 11.8 ML/M^2
BH CV ECHO MEAS - SI(TEICH): 26.4 ML/M^2
BH CV ECHO MEAS - SV(AO): 249 ML
BH CV ECHO MEAS - SV(CUBED): 63.2 ML
BH CV ECHO MEAS - SV(LVOT): 113.6 ML
BH CV ECHO MEAS - SV(MOD-SP4): 26 ML
BH CV ECHO MEAS - SV(RVOT): 43.4 ML
BH CV ECHO MEAS - SV(TEICH): 58.1 ML
BH CV ECHO MEAS - TAPSE (>1.6): 2.3 CM2
BH CV ECHO MEAS - TR MAX VEL: 201 CM/SEC
BH CV XLRA - RV BASE: 3.2 CM
BH CV XLRA - RV LENGTH: 6 CM
BH CV XLRA - RV MID: 6 CM
BH CV XLRA - TDI S': 12 CM/SEC
LEFT ATRIUM VOLUME INDEX: 24.7 ML/M2

## 2017-06-13 DIAGNOSIS — Z72.0 TOBACCO ABUSE: Primary | ICD-10-CM

## 2017-09-08 RX ORDER — OMEPRAZOLE 20 MG/1
CAPSULE, DELAYED RELEASE ORAL
Qty: 30 CAPSULE | Refills: 0 | Status: SHIPPED | OUTPATIENT
Start: 2017-09-08 | End: 2017-12-04

## 2017-09-08 RX ORDER — OMEPRAZOLE 20 MG/1
CAPSULE, DELAYED RELEASE ORAL
Qty: 30 CAPSULE | Refills: 0 | Status: SHIPPED | OUTPATIENT
Start: 2017-09-08 | End: 2017-11-02 | Stop reason: SDUPTHER

## 2017-11-03 RX ORDER — OMEPRAZOLE 20 MG/1
CAPSULE, DELAYED RELEASE ORAL
Qty: 30 CAPSULE | Refills: 0 | Status: SHIPPED | OUTPATIENT
Start: 2017-11-03 | End: 2017-12-03 | Stop reason: SDUPTHER

## 2017-11-10 ENCOUNTER — CONSULT (OUTPATIENT)
Dept: BARIATRICS/WEIGHT MGMT | Facility: CLINIC | Age: 47
End: 2017-11-10

## 2017-11-10 ENCOUNTER — LAB (OUTPATIENT)
Dept: LAB | Facility: HOSPITAL | Age: 47
End: 2017-11-10

## 2017-11-10 VITALS
HEIGHT: 65 IN | WEIGHT: 240 LBS | TEMPERATURE: 98 F | DIASTOLIC BLOOD PRESSURE: 93 MMHG | HEART RATE: 76 BPM | BODY MASS INDEX: 39.99 KG/M2 | SYSTOLIC BLOOD PRESSURE: 141 MMHG | RESPIRATION RATE: 16 BRPM

## 2017-11-10 DIAGNOSIS — M25.50 MULTIPLE JOINT PAIN: ICD-10-CM

## 2017-11-10 DIAGNOSIS — E66.01 MORBID OBESITY (HCC): Primary | ICD-10-CM

## 2017-11-10 DIAGNOSIS — F41.8 DEPRESSION WITH ANXIETY: ICD-10-CM

## 2017-11-10 DIAGNOSIS — R53.82 CHRONIC FATIGUE: ICD-10-CM

## 2017-11-10 DIAGNOSIS — J42 CHRONIC BRONCHITIS, UNSPECIFIED CHRONIC BRONCHITIS TYPE (HCC): ICD-10-CM

## 2017-11-10 DIAGNOSIS — G47.33 OSA (OBSTRUCTIVE SLEEP APNEA): ICD-10-CM

## 2017-11-10 DIAGNOSIS — E66.01 MORBID OBESITY (HCC): ICD-10-CM

## 2017-11-10 DIAGNOSIS — E66.01 OBESITY, CLASS III, BMI 40-49.9 (MORBID OBESITY) (HCC): Primary | ICD-10-CM

## 2017-11-10 LAB
ALBUMIN SERPL-MCNC: 4 G/DL (ref 3.5–5.2)
ALBUMIN/GLOB SERPL: 1.2 G/DL
ALP SERPL-CCNC: 103 U/L (ref 39–117)
ALT SERPL W P-5'-P-CCNC: 19 U/L (ref 1–33)
ANION GAP SERPL CALCULATED.3IONS-SCNC: 11.3 MMOL/L
AST SERPL-CCNC: 15 U/L (ref 1–32)
BASOPHILS # BLD AUTO: 0.02 10*3/MM3 (ref 0–0.2)
BASOPHILS NFR BLD AUTO: 0.2 % (ref 0–1.5)
BILIRUB SERPL-MCNC: 0.2 MG/DL (ref 0.1–1.2)
BUN BLD-MCNC: 8 MG/DL (ref 6–20)
BUN/CREAT SERPL: 14 (ref 7–25)
CALCIUM SPEC-SCNC: 9.7 MG/DL (ref 8.6–10.5)
CHLORIDE SERPL-SCNC: 99 MMOL/L (ref 98–107)
CHOLEST SERPL-MCNC: 180 MG/DL (ref 0–200)
CO2 SERPL-SCNC: 26.7 MMOL/L (ref 22–29)
CREAT BLD-MCNC: 0.57 MG/DL (ref 0.57–1)
DEPRECATED RDW RBC AUTO: 46.5 FL (ref 37–54)
EOSINOPHIL # BLD AUTO: 0.29 10*3/MM3 (ref 0–0.7)
EOSINOPHIL NFR BLD AUTO: 3.3 % (ref 0.3–6.2)
ERYTHROCYTE [DISTWIDTH] IN BLOOD BY AUTOMATED COUNT: 12.9 % (ref 11.7–13)
GFR SERPL CREATININE-BSD FRML MDRD: 114 ML/MIN/1.73
GLOBULIN UR ELPH-MCNC: 3.4 GM/DL
GLUCOSE BLD-MCNC: 92 MG/DL (ref 65–99)
HBA1C MFR BLD: 5.7 % (ref 4.8–5.6)
HCT VFR BLD AUTO: 45.7 % (ref 35.6–45.5)
HDLC SERPL-MCNC: 44 MG/DL (ref 40–60)
HGB BLD-MCNC: 15 G/DL (ref 11.9–15.5)
IMM GRANULOCYTES # BLD: 0.02 10*3/MM3 (ref 0–0.03)
IMM GRANULOCYTES NFR BLD: 0.2 % (ref 0–0.5)
LDLC SERPL CALC-MCNC: 104 MG/DL (ref 0–100)
LDLC/HDLC SERPL: 2.36 {RATIO}
LYMPHOCYTES # BLD AUTO: 2.8 10*3/MM3 (ref 0.9–4.8)
LYMPHOCYTES NFR BLD AUTO: 31.9 % (ref 19.6–45.3)
MCH RBC QN AUTO: 32.3 PG (ref 26.9–32)
MCHC RBC AUTO-ENTMCNC: 32.8 G/DL (ref 32.4–36.3)
MCV RBC AUTO: 98.3 FL (ref 80.5–98.2)
MONOCYTES # BLD AUTO: 0.49 10*3/MM3 (ref 0.2–1.2)
MONOCYTES NFR BLD AUTO: 5.6 % (ref 5–12)
NEUTROPHILS # BLD AUTO: 5.17 10*3/MM3 (ref 1.9–8.1)
NEUTROPHILS NFR BLD AUTO: 58.8 % (ref 42.7–76)
PLATELET # BLD AUTO: 287 10*3/MM3 (ref 140–500)
PMV BLD AUTO: 10.4 FL (ref 6–12)
POTASSIUM BLD-SCNC: 4.9 MMOL/L (ref 3.5–5.2)
PROT SERPL-MCNC: 7.4 G/DL (ref 6–8.5)
RBC # BLD AUTO: 4.65 10*6/MM3 (ref 3.9–5.2)
SODIUM BLD-SCNC: 137 MMOL/L (ref 136–145)
TRIGL SERPL-MCNC: 160 MG/DL (ref 0–150)
TSH SERPL DL<=0.05 MIU/L-ACNC: 0.55 MIU/ML (ref 0.27–4.2)
VLDLC SERPL-MCNC: 32 MG/DL (ref 5–40)
WBC NRBC COR # BLD: 8.79 10*3/MM3 (ref 4.5–10.7)

## 2017-11-10 PROCEDURE — 85025 COMPLETE CBC W/AUTO DIFF WBC: CPT

## 2017-11-10 PROCEDURE — 80061 LIPID PANEL: CPT

## 2017-11-10 PROCEDURE — 80053 COMPREHEN METABOLIC PANEL: CPT

## 2017-11-10 PROCEDURE — 84443 ASSAY THYROID STIM HORMONE: CPT

## 2017-11-10 PROCEDURE — 36415 COLL VENOUS BLD VENIPUNCTURE: CPT

## 2017-11-10 PROCEDURE — 83036 HEMOGLOBIN GLYCOSYLATED A1C: CPT

## 2017-11-10 PROCEDURE — 99215 OFFICE O/P EST HI 40 MIN: CPT | Performed by: NURSE PRACTITIONER

## 2017-11-10 NOTE — PROGRESS NOTES
"Bariatric Nutrition Counseling Interview    Patient Name:  Leena Mims  YOB: 1970  Age:  47 y.o.  Sex:  female  MRN: [id number]  Date:  11/10/17    Procedure Considering:  Sleeve    Last Documented Height:    Ht Readings from Last 1 Encounters:   11/10/17 65\" (165.1 cm)     Last Documented Weight:   Wt Readings from Last 1 Encounters:   11/10/17 240 lb (109 kg)      Body mass index is 39.94 kg/(m^2).    Highest Weight:  275 lb.  Goal Weight: 140 lb.    History:  Past Medical History:   Diagnosis Date   • Asthma    • COPD (chronic obstructive pulmonary disease)    • Dizziness    • Dysmenorrhea    • Edema    • Fatigue    • Fibromyalgia    • GERD (gastroesophageal reflux disease)    • Heartburn    • IBS (irritable bowel syndrome)    • Kidney stone    • Multiple joint pain    • SULEMA (obstructive sleep apnea)    • Plantar fasciitis    • RLS (restless legs syndrome)    • Sciatica    • Urge and stress incontinence      Past Surgical History:   Procedure Laterality Date   • COLONOSCOPY     • CYSTOSCOPY W/ LASER LITHOTRIPSY     • EAR TUBES     • ENDOSCOPY     • HYSTERECTOMY     • LAPAROSCOPIC CHOLECYSTECTOMY     • TONSILLECTOMY       Family History   Problem Relation Age of Onset   • Obesity Mother    • Stroke Mother    • Diabetes Sister    • Obesity Sister    • Breast cancer Neg Hx    • Ovarian cancer Neg Hx      Social History     Social History   • Marital status:      Spouse name: N/A   • Number of children: 3   • Years of education: N/A     Social History Main Topics   • Smoking status: Former Smoker     Types: Cigarettes     Quit date: 4/1/2017   • Smokeless tobacco: Not on file   • Alcohol use No   • Drug use: No   • Sexual activity: Defer     Other Topics Concern   • Not on file     Social History Narrative     Additional Health Issues to Consider:  Migraines, GERD,joint pain    Weight History:  Leena has struggled with weight for the past decade. She attributes this to lifestyle " changes    Previous Weight Loss Efforts:  Calorie counting,Phentermine, OTC  Most Successful Weight Loss Effort:  Phentermine    Eating Habits: Eat in response to stress, Eat too fast  Eat three meals on most days?  No  Worst eating habit?  Late night eating    How often do you eat fast food? two times weekly    Do you exercise regularly? (at least 3 times each week)  No    Occupation:      Personal Goal After Procedure:  Leena wants to have the energy to keep up with her grandchildren. She wants to be healthier.  Personal Support:      Assessment:  We reviewed program requirements for weight loss following surgery. We discussed the impact of personal habits and lifestyle behaviors on diet efforts. Program materials, including a reduced calorie diet were provided, discussed and recommended as the regimen to follow for pre and post diet planning. Leena demonstrated a good comprehension of diet requirements as well as a commitment to work on personal challenges. She will make a good candidate for weight loss surgery.                                  Electronically signed by:  Evelyn Yu RD  11/10/17 1:41 PM

## 2017-11-10 NOTE — PROGRESS NOTES
[facility]ATRIC [facility] BARIATRIC SURGERY  [address]  [facility] [address] [phone]  [address]  [facility] [address] [phone]  Dept: [phone]  11/10/2017      Leena Mims.  [id number]  [id number]  1970  female      Chief Complaint of weight gain; unable to maintain weight loss    History of Present Illness:   Leena is a 47 y.o. female who presents today for evaluation, education and consultation regarding weight loss surgery. The patient is interested in the sleeve gastrectomy.      Diet History:Leena has been overweight for at least 13 years, has been 35 pounds or more overweight for at least 35 years, has been 100 pounds or more overweight for 12 or more years and started dieting at age 31.  The most weight Leena lost was 25 pounds on adipex and fasting and maintained the weight loss for 4mo. Leena describes her eating habits as overeating at mealtimes, drinking sodas, eating to cope with anxiety. Leena Mims has tried Fasting, reduced calorie, exercising and medications among others with success of losing up to 25 pounds, but in each instance regained the weight.  See dietician documentation for complete history.    Bariatric Surgery Evaluation: The patient is being seen for an initial visit for bariatric surgery evaluation.     Bariatric Co-morbidities:  sleep apnea, back pain, knee pain, GERD, asthma, depression and urinary stress incontinence    Patient Active Problem List   Diagnosis   • Obesity, Class III, BMI 40-49.9 (morbid obesity)   • SULEMA (obstructive sleep apnea)   • GERD (gastroesophageal reflux disease)   • Chronic fatigue   • Edema   • Multiple joint pain   • Depression with anxiety   • Asthma   • Tobacco abuse   • Dietary counseling   • IBS (irritable bowel syndrome)   • Chronic bronchitis       Past Medical History:   Diagnosis Date   • Asthma    • COPD (chronic obstructive pulmonary disease)    • Dizziness     • Dysmenorrhea    • Edema    • Fatigue    • Fibromyalgia    • GERD (gastroesophageal reflux disease)    • Heartburn    • IBS (irritable bowel syndrome)    • Kidney stone    • Multiple joint pain    • SULEMA (obstructive sleep apnea)    • Plantar fasciitis    • RLS (restless legs syndrome)    • Sciatica    • Urge and stress incontinence        Past Surgical History:   Procedure Laterality Date   • COLONOSCOPY     • CYSTOSCOPY W/ LASER LITHOTRIPSY     • EAR TUBES     • ENDOSCOPY     • HYSTERECTOMY     • LAPAROSCOPIC CHOLECYSTECTOMY     • TONSILLECTOMY         Allergies   Allergen Reactions   • Aspirin    • Celebrex [Celecoxib]    • Contrast Dye    • Penicillins          Current Outpatient Prescriptions:   •  ALBUTEROL IN, Inhale 1 puff As Needed., Disp: , Rfl:   •  BREO ELLIPTA 100-25 MCG/INH aerosol powder , INL 1 PUFF PO D, Disp: , Rfl: 2  •  dicyclomine (BENTYL) 10 MG capsule, Take 10 mg by mouth 4 (four) times a day before meals and nightly., Disp: , Rfl:   •  gabapentin (NEURONTIN) 300 MG capsule, Take 300 mg by mouth 3 (three) times a day., Disp: , Rfl:   •  IBUPROFEN PO, Take 200 mg by mouth As Needed., Disp: , Rfl:   •  montelukast (SINGULAIR) 10 MG tablet, Take 10 mg by mouth every night., Disp: , Rfl:   •  omeprazole (priLOSEC) 20 MG capsule, TAKE 1 CAPSULE BY MOUTH DAILY, Disp: 30 capsule, Rfl: 3  •  omeprazole (priLOSEC) 20 MG capsule, TAKE 1 CAPSULE BY MOUTH DAILY, Disp: 30 capsule, Rfl: 0  •  omeprazole (priLOSEC) 20 MG capsule, TAKE 1 CAPSULE BY MOUTH DAILY, Disp: 30 capsule, Rfl: 0    Social History     Social History   • Marital status:      Spouse name: N/A   • Number of children: 3   • Years of education: N/A     Occupational History   • Not on file.     Social History Main Topics   • Smoking status: Former Smoker     Types: Cigarettes     Quit date: 4/1/2017   • Smokeless tobacco: Not on file   • Alcohol use No   • Drug use: No   • Sexual activity: Defer     Other Topics Concern   • Not on  file     Social History Narrative       Family History   Problem Relation Age of Onset   • Obesity Mother    • Stroke Mother    • Diabetes Sister    • Obesity Sister    • Breast cancer Neg Hx    • Ovarian cancer Neg Hx          Review of Systems:  Review of Systems   Constitutional: Positive for fatigue.   HENT: Negative.    Respiratory: Negative.    Cardiovascular: Negative.    Gastrointestinal: Negative.    Endocrine: Negative.    Genitourinary: Negative.    Musculoskeletal: Positive for back pain.   Skin: Negative.    Neurological: Negative.    Psychiatric/Behavioral: Negative.        Physical Exam:  Vital Signs:  Weight: 240 lb (109 kg)   Body mass index is 39.94 kg/(m^2).  Temp: 98 °F (36.7 °C)   Heart Rate: 76   BP: 141/93     Physical Exam   Constitutional: She is oriented to person, place, and time. She appears well-developed and well-nourished.   HENT:   Head: Normocephalic and atraumatic.   Neck: Normal range of motion.   Cardiovascular: Normal rate, regular rhythm and normal heart sounds.    Pulmonary/Chest: Effort normal and breath sounds normal. No respiratory distress. She has no wheezes.   Abdominal: Soft. Bowel sounds are normal. She exhibits no distension. There is no tenderness.   Musculoskeletal: She exhibits no edema or deformity.   Neurological: She is alert and oriented to person, place, and time.   Skin: Skin is warm and dry.   Psychiatric: She has a normal mood and affect. Her behavior is normal.   Nursing note and vitals reviewed.         Assessment:         Leena Mims is a 47 y.o. year old female with medically complicated severe obesity. Weight: 240 lb (109 kg), Body mass index is 39.94 kg/(m^2). and weight related problems including sleep apnea, back pain, knee pain, GERD, asthma, depression, tobacco use and urinary stress incontinence.    I explained in detail the procedures that we are performing.  All of those procedures can be performed laparoscopically but there is a chance to  convert to open if any technical challenges or complications do occur.  Bariatric surgery is not cosmetic surgery but rather a tool to help a patient make a life-long commitment lifestyle changes including diet, exercise, behavior changes, and taking supplemental vitamins and minerals.    Due to the patient's BMI and co-morbidities they are at a high risk for surgery and will obtain the following:  The patient has been advised that a letter of medical support and a history and physical must be obtained from her primary care physician. A psychological evaluation will be arranged for this patient. CBC, CMP, FLP, TSH and HgbA1C will be drawn. Leena Mims will obtain a pre-operative CXR and EKG. Leenazakiya Spearrcy will obtain clearance from a cardiologist and pulmonologist prior to surgery.     Leena TR SpearFelicita will be set up for a pre-operative diagnostic esophagogastroduodenoscopy with biopsy for evaluation. The risks and benefits of the procedure were discussed with the patient in detail and all questions were answered.  Possibility of perforation, bleeding, aspiration, anoxic brain injury, respiratory and/or cardiac arrest and death were discussed.   She received handouts regarding, all questions were answered and informed consent was obtained.     The risks, benefits, alternatives, and potential complications of all of the procedures were explained in detail including, but not limited to death, anesthesia and medication adverse effect/DVT, pulmonary embolism, trocar site/incisional hernia, wound infection, abdominal infection, bleeding, failure to lose weight or gain weight and change in body image, metabolic complications with calcium, thiamine, vitamin B12, folate, iron, and anemia.    The patient was advised to start a high protein, low fat and low carbohydrate diet. The patient was given individualized information by our dietician along with general group information and handouts.     The patient was given  information regarding the CHIDI educational video. CHIDI is an internet based educational video which explains the surgical procedure and answers basic questions regarding the procedure. The patient was provided with instructions and a password to watch the video.    The patient was encouraged to start routine exercise including but not limited to 150 minutes per week. The patient received a resistance band along with a handout of exercises.     The consultation plan was reviewed with the patient.    The patient understands the surgical procedures and the different surgical options that are available.  She understands the lifestyle changes that would be required after surgery and has agreed to participate in a pre-operative and postoperative weight management program.  She also expressed understanding of possible risks, had several questions answered and desires to proceed.    I think she is a good candidate for this surgery, and is interested in a sleeve gastrectomy.    Encounter Diagnoses   Name Primary?   • Obesity, Class III, BMI 40-49.9 (morbid obesity) Yes   • SULEMA (obstructive sleep apnea)    • Chronic bronchitis, unspecified chronic bronchitis type    • Multiple joint pain    • Chronic fatigue    • Depression with anxiety        Plan:    Patient will have evaluations and follow up with bariatric dieticians and a psychologist before undergoing a multidisciplinary review of her candidacy.  We also discussed the weight loss requirement and rationale, and other program requirements.      Xenia Montelongo, APRN  11/10/2017

## 2017-11-14 ENCOUNTER — TELEPHONE (OUTPATIENT)
Dept: BARIATRICS/WEIGHT MGMT | Facility: CLINIC | Age: 47
End: 2017-11-14

## 2017-11-14 NOTE — TELEPHONE ENCOUNTER
----- Message from ROXY Chawla sent at 11/10/2017  1:46 PM EST -----  Please forward to pt's PCP. Thank you!  Ayaka

## 2017-12-04 RX ORDER — OMEPRAZOLE 20 MG/1
CAPSULE, DELAYED RELEASE ORAL
Qty: 30 CAPSULE | Refills: 0 | Status: SHIPPED | OUTPATIENT
Start: 2017-12-04 | End: 2018-01-01 | Stop reason: SDUPTHER

## 2018-01-02 RX ORDER — OMEPRAZOLE 20 MG/1
CAPSULE, DELAYED RELEASE ORAL
Qty: 30 CAPSULE | Refills: 0 | Status: SHIPPED | OUTPATIENT
Start: 2018-01-02 | End: 2018-01-27 | Stop reason: SDUPTHER

## 2018-01-09 ENCOUNTER — PREP FOR SURGERY (OUTPATIENT)
Dept: OTHER | Facility: HOSPITAL | Age: 48
End: 2018-01-09

## 2018-01-09 RX ORDER — CHLORHEXIDINE GLUCONATE 0.12 MG/ML
15 RINSE ORAL SEE ADMIN INSTRUCTIONS
Status: CANCELLED | OUTPATIENT
Start: 2018-02-05

## 2018-01-09 RX ORDER — SODIUM CHLORIDE 0.9 % (FLUSH) 0.9 %
1-10 SYRINGE (ML) INJECTION AS NEEDED
Status: CANCELLED | OUTPATIENT
Start: 2018-02-05

## 2018-01-09 RX ORDER — ACETAMINOPHEN 160 MG/5ML
1000 SOLUTION ORAL ONCE
Status: CANCELLED | OUTPATIENT
Start: 2018-02-05 | End: 2018-02-05

## 2018-01-09 RX ORDER — SCOLOPAMINE TRANSDERMAL SYSTEM 1 MG/1
1 PATCH, EXTENDED RELEASE TRANSDERMAL ONCE
Status: CANCELLED | OUTPATIENT
Start: 2018-02-05 | End: 2018-02-05

## 2018-01-09 RX ORDER — FAMOTIDINE 10 MG/ML
20 INJECTION, SOLUTION INTRAVENOUS ONCE
Status: CANCELLED | OUTPATIENT
Start: 2018-02-05 | End: 2018-02-05

## 2018-01-09 RX ORDER — SODIUM CHLORIDE, SODIUM LACTATE, POTASSIUM CHLORIDE, CALCIUM CHLORIDE 600; 310; 30; 20 MG/100ML; MG/100ML; MG/100ML; MG/100ML
100 INJECTION, SOLUTION INTRAVENOUS CONTINUOUS
Status: CANCELLED | OUTPATIENT
Start: 2018-02-05

## 2018-01-09 RX ORDER — CEFAZOLIN SODIUM 2 G/100ML
2 INJECTION, SOLUTION INTRAVENOUS ONCE
Status: CANCELLED | OUTPATIENT
Start: 2018-02-05 | End: 2018-02-05

## 2018-01-25 ENCOUNTER — CONSULT (OUTPATIENT)
Dept: BARIATRICS/WEIGHT MGMT | Facility: CLINIC | Age: 48
End: 2018-01-25

## 2018-01-25 VITALS
RESPIRATION RATE: 16 BRPM | HEART RATE: 98 BPM | HEIGHT: 65 IN | SYSTOLIC BLOOD PRESSURE: 148 MMHG | BODY MASS INDEX: 38.82 KG/M2 | WEIGHT: 233 LBS | TEMPERATURE: 98.3 F | DIASTOLIC BLOOD PRESSURE: 95 MMHG

## 2018-01-25 DIAGNOSIS — K21.9 GASTROESOPHAGEAL REFLUX DISEASE WITHOUT ESOPHAGITIS: ICD-10-CM

## 2018-01-25 DIAGNOSIS — J45.20 MILD INTERMITTENT ASTHMA WITHOUT COMPLICATION: ICD-10-CM

## 2018-01-25 DIAGNOSIS — M25.50 MULTIPLE JOINT PAIN: ICD-10-CM

## 2018-01-25 DIAGNOSIS — Z71.3 DIETARY COUNSELING: ICD-10-CM

## 2018-01-25 DIAGNOSIS — E66.01 OBESITY, CLASS III, BMI 40-49.9 (MORBID OBESITY) (HCC): Primary | ICD-10-CM

## 2018-01-25 DIAGNOSIS — J41.0 SIMPLE CHRONIC BRONCHITIS (HCC): ICD-10-CM

## 2018-01-25 DIAGNOSIS — R53.82 CHRONIC FATIGUE: ICD-10-CM

## 2018-01-25 DIAGNOSIS — K58.9 IRRITABLE BOWEL SYNDROME WITHOUT DIARRHEA: ICD-10-CM

## 2018-01-25 DIAGNOSIS — F41.8 DEPRESSION WITH ANXIETY: ICD-10-CM

## 2018-01-25 DIAGNOSIS — Z72.0 TOBACCO ABUSE: ICD-10-CM

## 2018-01-25 DIAGNOSIS — G47.33 OSA (OBSTRUCTIVE SLEEP APNEA): ICD-10-CM

## 2018-01-25 DIAGNOSIS — R60.9 EDEMA, UNSPECIFIED TYPE: ICD-10-CM

## 2018-01-25 PROCEDURE — 99215 OFFICE O/P EST HI 40 MIN: CPT | Performed by: SURGERY

## 2018-01-25 RX ORDER — CYCLOBENZAPRINE HCL 10 MG
10 TABLET ORAL 2 TIMES DAILY PRN
Refills: 0 | COMMUNITY
Start: 2018-01-04 | End: 2022-05-02

## 2018-01-25 NOTE — H&P
Bariatric Consult:  Referred by Telma Tavera MD    Leena Mims is here today for consult on Consult (Consult GS)      History of Present Illness:     Leena Mims is a 47 y.o. female with morbid obesity with co-morbidities including sleep apnea, back pain, GERD and asthma who presents for surgical consultation for the above procedure. Leena has completed the initial intake visit and has been examined by our nurse practitioner, dietician, psychologist and underwent the extensive educational teaching process under the guidance of our bariatric coordinator and myself. Leena also has seen the educational video CHIDI on the surgical procedure if available. Leena attended today more educational teaching from our bariatric coordinator and myself. Leena has had an extensive medical workup including a visit with their primary care physician, EKG, chest radiograph, blood work, EGD or UGI and possibly further testing. These have been reviewed by me and discussed with the patient. Leena is now ready to proceed with surgery. Leena presently denies nausea, vomiting, fever, chills, chest pain, shortness of air, melena, hematochezia, hemetemesis, dysuria, frequency, hematuria, jaundice or abdominal pain.       Past Medical History:   Diagnosis Date   • Asthma    • COPD (chronic obstructive pulmonary disease)    • Dizziness    • Dysmenorrhea    • Edema    • Fatigue    • Fibromyalgia    • GERD (gastroesophageal reflux disease)    • Heartburn    • IBS (irritable bowel syndrome)    • Kidney stone    • Multiple joint pain    • SULEMA (obstructive sleep apnea)    • Plantar fasciitis    • RLS (restless legs syndrome)    • Sciatica    • Urge and stress incontinence        Encounter Diagnoses   Name Primary?   • Obesity, Class III, BMI 40-49.9 (morbid obesity) Yes   • SULEMA (obstructive sleep apnea)    • Gastroesophageal reflux disease without esophagitis    • Chronic fatigue    • Edema, unspecified type    • Multiple joint pain     • Depression with anxiety    • Mild intermittent asthma without complication    • Tobacco abuse    • Irritable bowel syndrome without diarrhea    • Dietary counseling    • Simple chronic bronchitis        Past Surgical History:   Procedure Laterality Date   • COLONOSCOPY     • CYSTOSCOPY W/ LASER LITHOTRIPSY     • EAR TUBES     • ENDOSCOPY     • HYSTERECTOMY     • LAPAROSCOPIC CHOLECYSTECTOMY     • TONSILLECTOMY         Patient Active Problem List   Diagnosis   • Obesity, Class III, BMI 40-49.9 (morbid obesity)   • SULEMA (obstructive sleep apnea)   • GERD (gastroesophageal reflux disease)   • Chronic fatigue   • Edema   • Multiple joint pain   • Depression with anxiety   • Asthma   • Dietary counseling   • IBS (irritable bowel syndrome)   • Simple chronic bronchitis       Allergies   Allergen Reactions   • Aspirin    • Celebrex [Celecoxib]    • Contrast Dye    • Penicillins          Current Outpatient Prescriptions:   •  ALBUTEROL IN, Inhale 1 puff As Needed., Disp: , Rfl:   •  BREO ELLIPTA 100-25 MCG/INH aerosol powder , INL 1 PUFF PO D, Disp: , Rfl: 2  •  cyclobenzaprine (FLEXERIL) 10 MG tablet, , Disp: , Rfl: 0  •  dicyclomine (BENTYL) 10 MG capsule, Take 10 mg by mouth 4 (four) times a day before meals and nightly., Disp: , Rfl:   •  IBUPROFEN PO, Take 200 mg by mouth As Needed., Disp: , Rfl:   •  montelukast (SINGULAIR) 10 MG tablet, Take 10 mg by mouth every night., Disp: , Rfl:   •  omeprazole (priLOSEC) 20 MG capsule, TAKE 1 CAPSULE BY MOUTH DAILY, Disp: 30 capsule, Rfl: 0  •  folic acid-pyridoxine-cyanocobalamin (FOLBIC) 2.5-25-2 MG tablet tablet, Take 1 tablet by mouth Daily. Begin two weeks before surgery, Disp: 40 each, Rfl: 0    Social History     Social History   • Marital status:      Spouse name: N/A   • Number of children: 3   • Years of education: N/A     Occupational History   • Not on file.     Social History Main Topics   • Smoking status: Former Smoker     Types: Cigarettes     Quit  date: 4/1/2017   • Smokeless tobacco: Never Used   • Alcohol use No   • Drug use: No   • Sexual activity: Defer     Other Topics Concern   • Not on file     Social History Narrative       Family History   Problem Relation Age of Onset   • Obesity Mother    • Stroke Mother    • Diabetes Sister    • Obesity Sister    • Breast cancer Neg Hx    • Ovarian cancer Neg Hx        Review of Systems:  Review of Systems   Constitutional: Positive for fatigue.   Gastrointestinal: Positive for constipation.   Musculoskeletal: Positive for arthralgias and back pain.   All other systems reviewed and are negative.        Physical Exam:    Vital Signs:  Weight: 106 kg (233 lb)   Body mass index is 38.77 kg/(m^2).  Temp: 98.3 °F (36.8 °C)   Heart Rate: 98   BP: 148/95       Physical Exam   Constitutional: She is oriented to person, place, and time. She appears well-nourished.   HENT:   Head: Normocephalic and atraumatic.   Mouth/Throat: Oropharynx is clear and moist.   Eyes: Conjunctivae and EOM are normal. Pupils are equal, round, and reactive to light. No scleral icterus.   Neck: Normal range of motion. Neck supple. No thyromegaly present.   Cardiovascular: Normal rate and regular rhythm.    Pulmonary/Chest: Effort normal and breath sounds normal.   Abdominal: Soft. Bowel sounds are normal. She exhibits no distension and no mass. There is no tenderness. There is no rebound and no guarding. No hernia.   Musculoskeletal: Normal range of motion.   Lymphadenopathy:     She has no cervical adenopathy.   Neurological: She is alert and oriented to person, place, and time. No cranial nerve deficit. Coordination normal.   Skin: Skin is warm and dry. No erythema.   Psychiatric: She has a normal mood and affect. Her behavior is normal.   Vitals reviewed.        Assessment:    Leena Mims is a 47 y.o. year old female with medically complicated severe obesity with a BMI of Body mass index is 38.77 kg/(m^2). and multiple co-morbidities listed  in the encounter diagnosis.    I think she is an appropriate candidate for this surgery, and is ready to proceed.      Plan/Discussion/Summary:  No hiatal hernia per me.  Patient does take PPI on regular basis.  Helicobacter pylori negative.  Gastric polyp benign.  Patient quit smoking April 2017 and does understand increased risk of complications if she were to smoke after surgery.    The patient has returned to the office for a surgical consultation and has requested to proceed with a laparoscopic gastric sleeve.  I have had the opportunity to obtain a history, examine the patient and review the patient's chart.    The patient understands that surgery is a tool and that weight loss is not guaranteed but only seen in the context of appropriate use, regular follow up, exercise and making appropriate food choices.     I personally discussed the potential complications of the laparoscopic gastric sleeve with this patient.  The patient is well aware of potential complications of the surgery that include but not limited to bleeding, infections, deep vein thrombosis, pulmonary embolism, pulmonary complications such as pneumonia, cardiac event, hernias, small bowel obstruction, damage to the spleen or other organs, bowel injury, disfiguring scars, failure to lose weight, need for additional surgery, conversion to an open procedure and death.  The patient is also aware of complications which apply in particular to the gastric sleeve and can include but not limited to the leakage of gastric contents at the staple line, the development of an intra-abdominal abscess, gastroesophageal reflux disease, Thompson's esophagus, ulcers, vitamin/mineral deficiencies, strictures, and the possibility of converting this procedure to a Darline-en-Y gastric bypass. The patient also understands the possibility of requiring an acid reducer medication for the rest of their life.    The risks, benefits, potential complications and alternative  therapies were discussed at great length as outlined in our extensive consent forms, online consent and educational teaching processes.    The patient has confirmed the participation in the programs extensive educational activities.    All questions and concerns were answered to patient's satisfaction.  The patient now wishes to proceed with surgery.    Patient has declined the pre-operative insertion of an IVC filter.     The patient has declined a postoperative course of anitcoagulant therapy.        I instructed patient to start on a H2 blocker or proton pump inhibitor if not already on one of these medications.    I explained in detail the procedures that we perform.  All of these procedures have a chance to convert to open if any technical challenges or complications do occur.  Bariatric surgery is not cosmetic surgery but rather a tool to help a patient make a life-long commitment lifestyle change including diet, exercise, behavior changes, and taking supplemental vitamins and minerals.    Problems after surgery may require more operations to correct them.    The risks, benefits, alternatives, and potential complications of all of the procedures were explained in detail including, but not limited to death, anesthesia and medication adverse effect, deep venous thrombosis, pulmonary embolism, trocar site/incisional hernia, wound infection, abdominal infection, bleeding, failure to lose weight, gain weight, a change in body image, metabolic complications with vitamin deficiences and anemia.    Weight loss expectations were discussed with the patient in detail. The weight loss operations most commonly performed are the sleeve gastrectomy and the Darline-en-Y gastric bypass. These operations result in weight losses up to approximately 25-35% of initial body weight 12 to 24 months after surgery with the gastric bypass usually the higher percent of weight loss. The longitudinal data shows maintenance of 75% of lost  weight after 10 years for the gastric bypass.    For the gastric bypass and loop duodenal switch (KAMALJIT-S) the risks include but not limited to the following early complications:  Anastomotic leak/peritonitis, Darline/Alimentary/biliopancreatic limb obstruction, severe & minor wound infection/seroma, and nausea/vomiting.  Late complications can include but are not limited to malnutrition, vitamin deficiencies, frequent loose stools,  stomal stenosis, marginal ulcer, bowel obstruction, intussusception, internal, and incisional hernia.    Regarding the gastric sleeve, there is less long-term outcome data and higher risk of dysphagia and reflux compared to a gastric bypass, as well as risk of internal visceral/organ injury, splenectomy, bleeding, infection, leak (which could require further intervention possible conversion to Darline-en-Y gastric bypass), stenosis and possibility of regaining weight.    Leena was counseled regarding diagnostic results, instructions for management, risk factor reductions, prognosis, patient and family education, impressions, risks and benefits of treatment options and importance of compliance with treatment. Total face to face time of the encounter was over 45 minutes and over 30 minutes was spent counseling.     Dmitry Report   As part of this patient's treatment plan I am prescribing controlled substances. The patient has been made aware of appropriate use of such medications, including potential risk of somnolence, limited ability to drive and /or work safely, and potential for dependence or overdose. It has also been made clear that these medications are for use by this patient only, without concomitant use of alcohol or other substances unless prescribed.    Leena has completed prescribing agreement detailing terms of continued prescribing of controlled substances, including monitoring DMITRY reports, urine drug screening, and pill counts if necessary. Leena is aware that inappropriate  use will result in cessation of prescribing such medications.    DMITRY report has been reviewed      History and physical exam exhibit continued safe and appropriate use of controlled substances.      Leena understands the surgical procedures and the different surgical options that are available.  She understands the lifestyle changes that are required after surgery and has agreed to follow the guidelines outlined in the weight management program.  She also expressed understanding of the risks involved and had all of female questions answered and desires to proceed.      Ej Coleman MD  1/25/2018

## 2018-01-25 NOTE — PATIENT INSTRUCTIONS
Bariatric Manual    You were provided a manual specific to the procedure that you have chosen.  Please refer to that with any questions or call the office at [phone]

## 2018-01-29 ENCOUNTER — APPOINTMENT (OUTPATIENT)
Dept: PREADMISSION TESTING | Facility: HOSPITAL | Age: 48
End: 2018-01-29

## 2018-01-29 RX ORDER — OMEPRAZOLE 20 MG/1
CAPSULE, DELAYED RELEASE ORAL
Qty: 30 CAPSULE | Refills: 0 | Status: SHIPPED | OUTPATIENT
Start: 2018-01-29 | End: 2018-02-24 | Stop reason: SDUPTHER

## 2018-02-02 ENCOUNTER — APPOINTMENT (OUTPATIENT)
Dept: PREADMISSION TESTING | Facility: HOSPITAL | Age: 48
End: 2018-02-02

## 2018-02-05 ENCOUNTER — ANESTHESIA EVENT (OUTPATIENT)
Dept: PERIOP | Facility: HOSPITAL | Age: 48
End: 2018-02-05

## 2018-02-05 ENCOUNTER — ANESTHESIA (OUTPATIENT)
Dept: PERIOP | Facility: HOSPITAL | Age: 48
End: 2018-02-05

## 2018-02-14 ENCOUNTER — HOSPITAL ENCOUNTER (INPATIENT)
Facility: HOSPITAL | Age: 48
LOS: 2 days | Discharge: HOME | End: 2018-02-16
Attending: SURGERY | Admitting: SURGERY

## 2018-02-14 PROCEDURE — 94799 UNLISTED PULMONARY SVC/PX: CPT

## 2018-02-14 PROCEDURE — 25010000002 ENOXAPARIN PER 10 MG: Performed by: SURGERY

## 2018-02-14 PROCEDURE — 25010000002 MIDAZOLAM PER 1 MG: Performed by: ANESTHESIOLOGY

## 2018-02-14 PROCEDURE — 0BQT4ZZ REPAIR DIAPHRAGM, PERCUTANEOUS ENDOSCOPIC APPROACH: ICD-10-PCS | Performed by: SURGERY

## 2018-02-14 PROCEDURE — 25010000002 KETOROLAC TROMETHAMINE PER 15 MG: Performed by: SURGERY

## 2018-02-14 PROCEDURE — 25010000002 PROPOFOL 10 MG/ML EMULSION: Performed by: NURSE ANESTHETIST, CERTIFIED REGISTERED

## 2018-02-14 PROCEDURE — 43775 LAP SLEEVE GASTRECTOMY: CPT | Performed by: NURSE PRACTITIONER

## 2018-02-14 PROCEDURE — 25010000002 ONDANSETRON PER 1 MG: Performed by: NURSE ANESTHETIST, CERTIFIED REGISTERED

## 2018-02-14 PROCEDURE — 43775 LAP SLEEVE GASTRECTOMY: CPT | Performed by: SURGERY

## 2018-02-14 PROCEDURE — 0DB64Z3 EXCISION OF STOMACH, PERCUTANEOUS ENDOSCOPIC APPROACH, VERTICAL: ICD-10-PCS | Performed by: SURGERY

## 2018-02-14 PROCEDURE — 25010000002 KETOROLAC TROMETHAMINE PER 15 MG: Performed by: NURSE ANESTHETIST, CERTIFIED REGISTERED

## 2018-02-14 PROCEDURE — 25010000003 CEFAZOLIN IN DEXTROSE 2-4 GM/100ML-% SOLUTION: Performed by: SURGERY

## 2018-02-14 PROCEDURE — 25010000002 HYDROMORPHONE PER 4 MG: Performed by: ANESTHESIOLOGY

## 2018-02-14 PROCEDURE — 25010000002 METOCLOPRAMIDE PER 10 MG: Performed by: SURGERY

## 2018-02-14 PROCEDURE — 25010000002 FENTANYL CITRATE (PF) 100 MCG/2ML SOLUTION: Performed by: NURSE ANESTHETIST, CERTIFIED REGISTERED

## 2018-02-14 DEVICE — STPL/LN REINF PERISTRIP DRY VERITAS GEL: Type: IMPLANTABLE DEVICE | Site: ABDOMEN | Status: FUNCTIONAL

## 2018-02-14 DEVICE — SEALANT FIBRIN TISSEEL FZ 4ML: Type: IMPLANTABLE DEVICE | Site: ABDOMEN | Status: FUNCTIONAL

## 2018-02-14 DEVICE — PERI-STRIPS DRY WITH VERITAS COLLAGEN MATRIX (PSD-V) IS PREPARED FROM DEHYDRATED BOVINE PERICARDIUM PROCURED FROM CATTLE UNDER 30 MONTHS OF AGE IN THE UNITED STATES. ONE (1) TUBE OF PSD GEL (GEL) IS PROVIDED FOR EVERY TWO (2) POUCHES OF PSD-V. THE GEL IS USED TO CREATE A TEMPORARY BOND BETWEEN THE PSD-V BUTTRESS AND THE SURGICAL STAPLER JAWS UNTIL THE STAPLER IS POSITIONED AND FIRED.
Type: IMPLANTABLE DEVICE | Site: ABDOMEN | Status: FUNCTIONAL
Brand: PERI-STRIPS DRY WITH VERITAS COLLAGEN MATRIX

## 2018-02-14 RX ORDER — SODIUM CHLORIDE, SODIUM LACTATE, POTASSIUM CHLORIDE, CALCIUM CHLORIDE 600; 310; 30; 20 MG/100ML; MG/100ML; MG/100ML; MG/100ML
100 INJECTION, SOLUTION INTRAVENOUS CONTINUOUS
Status: DISCONTINUED | OUTPATIENT
Start: 2018-02-14 | End: 2018-02-14 | Stop reason: HOSPADM

## 2018-02-14 RX ORDER — LIDOCAINE HYDROCHLORIDE 10 MG/ML
0.5 INJECTION, SOLUTION EPIDURAL; INFILTRATION; INTRACAUDAL; PERINEURAL ONCE AS NEEDED
Status: DISCONTINUED | OUTPATIENT
Start: 2018-02-14 | End: 2018-02-14 | Stop reason: HOSPADM

## 2018-02-14 RX ORDER — DIPHENHYDRAMINE HYDROCHLORIDE 50 MG/ML
25 INJECTION INTRAMUSCULAR; INTRAVENOUS EVERY 4 HOURS PRN
Status: DISCONTINUED | OUTPATIENT
Start: 2018-02-14 | End: 2018-02-16 | Stop reason: HOSPADM

## 2018-02-14 RX ORDER — PROMETHAZINE HYDROCHLORIDE 25 MG/1
25 TABLET ORAL ONCE AS NEEDED
Status: DISCONTINUED | OUTPATIENT
Start: 2018-02-14 | End: 2018-02-14 | Stop reason: HOSPADM

## 2018-02-14 RX ORDER — BUPIVACAINE HYDROCHLORIDE AND EPINEPHRINE 5; 5 MG/ML; UG/ML
INJECTION, SOLUTION PERINEURAL AS NEEDED
Status: DISCONTINUED | OUTPATIENT
Start: 2018-02-14 | End: 2018-02-14 | Stop reason: HOSPADM

## 2018-02-14 RX ORDER — HYDROMORPHONE HYDROCHLORIDE 2 MG/1
2 TABLET ORAL EVERY 4 HOURS PRN
Status: DISCONTINUED | OUTPATIENT
Start: 2018-02-14 | End: 2018-02-16 | Stop reason: HOSPADM

## 2018-02-14 RX ORDER — FAMOTIDINE 10 MG/ML
20 INJECTION, SOLUTION INTRAVENOUS EVERY 12 HOURS SCHEDULED
Status: DISCONTINUED | OUTPATIENT
Start: 2018-02-14 | End: 2018-02-16 | Stop reason: HOSPADM

## 2018-02-14 RX ORDER — FENTANYL CITRATE 50 UG/ML
50 INJECTION, SOLUTION INTRAMUSCULAR; INTRAVENOUS
Status: DISCONTINUED | OUTPATIENT
Start: 2018-02-14 | End: 2018-02-14 | Stop reason: HOSPADM

## 2018-02-14 RX ORDER — NALBUPHINE HCL 10 MG/ML
10 AMPUL (ML) INJECTION EVERY 4 HOURS PRN
Status: DISCONTINUED | OUTPATIENT
Start: 2018-02-14 | End: 2018-02-14 | Stop reason: HOSPADM

## 2018-02-14 RX ORDER — KETOROLAC TROMETHAMINE 30 MG/ML
INJECTION, SOLUTION INTRAMUSCULAR; INTRAVENOUS AS NEEDED
Status: DISCONTINUED | OUTPATIENT
Start: 2018-02-14 | End: 2018-02-14 | Stop reason: SURG

## 2018-02-14 RX ORDER — PROPOFOL 10 MG/ML
VIAL (ML) INTRAVENOUS AS NEEDED
Status: DISCONTINUED | OUTPATIENT
Start: 2018-02-14 | End: 2018-02-14 | Stop reason: SURG

## 2018-02-14 RX ORDER — SODIUM CHLORIDE 0.9 % (FLUSH) 0.9 %
1-10 SYRINGE (ML) INJECTION AS NEEDED
Status: DISCONTINUED | OUTPATIENT
Start: 2018-02-14 | End: 2018-02-14 | Stop reason: HOSPADM

## 2018-02-14 RX ORDER — FENTANYL CITRATE 50 UG/ML
INJECTION, SOLUTION INTRAMUSCULAR; INTRAVENOUS AS NEEDED
Status: DISCONTINUED | OUTPATIENT
Start: 2018-02-14 | End: 2018-02-14 | Stop reason: SURG

## 2018-02-14 RX ORDER — CHLORHEXIDINE GLUCONATE 0.12 MG/ML
15 RINSE ORAL SEE ADMIN INSTRUCTIONS
Status: COMPLETED | OUTPATIENT
Start: 2018-02-14 | End: 2018-02-14

## 2018-02-14 RX ORDER — PROMETHAZINE HYDROCHLORIDE 25 MG/1
25 SUPPOSITORY RECTAL ONCE AS NEEDED
Status: DISCONTINUED | OUTPATIENT
Start: 2018-02-14 | End: 2018-02-14 | Stop reason: HOSPADM

## 2018-02-14 RX ORDER — PROMETHAZINE HYDROCHLORIDE 25 MG/ML
6.25 INJECTION, SOLUTION INTRAMUSCULAR; INTRAVENOUS ONCE AS NEEDED
Status: DISCONTINUED | OUTPATIENT
Start: 2018-02-14 | End: 2018-02-14 | Stop reason: HOSPADM

## 2018-02-14 RX ORDER — PROMETHAZINE HYDROCHLORIDE 25 MG/ML
12.5 INJECTION, SOLUTION INTRAMUSCULAR; INTRAVENOUS EVERY 4 HOURS PRN
Status: DISCONTINUED | OUTPATIENT
Start: 2018-02-14 | End: 2018-02-16 | Stop reason: HOSPADM

## 2018-02-14 RX ORDER — ACETAMINOPHEN 650 MG/1
650 SUPPOSITORY RECTAL ONCE AS NEEDED
Status: DISCONTINUED | OUTPATIENT
Start: 2018-02-14 | End: 2018-02-14 | Stop reason: HOSPADM

## 2018-02-14 RX ORDER — OXYCODONE HYDROCHLORIDE AND ACETAMINOPHEN 5; 325 MG/1; MG/1
1 TABLET ORAL ONCE AS NEEDED
Status: DISCONTINUED | OUTPATIENT
Start: 2018-02-14 | End: 2018-02-14 | Stop reason: HOSPADM

## 2018-02-14 RX ORDER — HYDRALAZINE HYDROCHLORIDE 20 MG/ML
5 INJECTION INTRAMUSCULAR; INTRAVENOUS
Status: DISCONTINUED | OUTPATIENT
Start: 2018-02-14 | End: 2018-02-14 | Stop reason: HOSPADM

## 2018-02-14 RX ORDER — HYDROMORPHONE HYDROCHLORIDE 1 MG/ML
0.5 INJECTION, SOLUTION INTRAMUSCULAR; INTRAVENOUS; SUBCUTANEOUS
Status: DISCONTINUED | OUTPATIENT
Start: 2018-02-14 | End: 2018-02-16 | Stop reason: HOSPADM

## 2018-02-14 RX ORDER — SCOLOPAMINE TRANSDERMAL SYSTEM 1 MG/1
1 PATCH, EXTENDED RELEASE TRANSDERMAL ONCE
Status: DISCONTINUED | OUTPATIENT
Start: 2018-02-14 | End: 2018-02-14

## 2018-02-14 RX ORDER — NITROGLYCERIN 0.4 MG/1
0.4 TABLET SUBLINGUAL
Status: DISCONTINUED | OUTPATIENT
Start: 2018-02-14 | End: 2018-02-16 | Stop reason: HOSPADM

## 2018-02-14 RX ORDER — METOCLOPRAMIDE HYDROCHLORIDE 5 MG/ML
10 INJECTION INTRAMUSCULAR; INTRAVENOUS EVERY 6 HOURS
Status: DISCONTINUED | OUTPATIENT
Start: 2018-02-14 | End: 2018-02-16 | Stop reason: HOSPADM

## 2018-02-14 RX ORDER — ALBUTEROL SULFATE 2.5 MG/3ML
2.5 SOLUTION RESPIRATORY (INHALATION)
Status: DISCONTINUED | OUTPATIENT
Start: 2018-02-14 | End: 2018-02-16 | Stop reason: HOSPADM

## 2018-02-14 RX ORDER — KETOROLAC TROMETHAMINE 30 MG/ML
30 INJECTION, SOLUTION INTRAMUSCULAR; INTRAVENOUS 4 TIMES DAILY
Status: COMPLETED | OUTPATIENT
Start: 2018-02-14 | End: 2018-02-15

## 2018-02-14 RX ORDER — ACETAMINOPHEN 325 MG/1
650 TABLET ORAL ONCE AS NEEDED
Status: DISCONTINUED | OUTPATIENT
Start: 2018-02-14 | End: 2018-02-14 | Stop reason: HOSPADM

## 2018-02-14 RX ORDER — NALOXONE HCL 0.4 MG/ML
0.4 VIAL (ML) INJECTION AS NEEDED
Status: DISCONTINUED | OUTPATIENT
Start: 2018-02-14 | End: 2018-02-14 | Stop reason: HOSPADM

## 2018-02-14 RX ORDER — DIPHENHYDRAMINE HYDROCHLORIDE 50 MG/ML
12.5 INJECTION INTRAMUSCULAR; INTRAVENOUS
Status: DISCONTINUED | OUTPATIENT
Start: 2018-02-14 | End: 2018-02-14 | Stop reason: HOSPADM

## 2018-02-14 RX ORDER — ROCURONIUM BROMIDE 10 MG/ML
INJECTION, SOLUTION INTRAVENOUS AS NEEDED
Status: DISCONTINUED | OUTPATIENT
Start: 2018-02-14 | End: 2018-02-14 | Stop reason: SURG

## 2018-02-14 RX ORDER — MIDAZOLAM HYDROCHLORIDE 1 MG/ML
1 INJECTION INTRAMUSCULAR; INTRAVENOUS
Status: DISCONTINUED | OUTPATIENT
Start: 2018-02-14 | End: 2018-02-14 | Stop reason: HOSPADM

## 2018-02-14 RX ORDER — ONDANSETRON 4 MG/1
4 TABLET, ORALLY DISINTEGRATING ORAL EVERY 4 HOURS PRN
Status: DISCONTINUED | OUTPATIENT
Start: 2018-02-14 | End: 2018-02-16 | Stop reason: HOSPADM

## 2018-02-14 RX ORDER — NALBUPHINE HCL 10 MG/ML
2 AMPUL (ML) INJECTION EVERY 4 HOURS PRN
Status: DISCONTINUED | OUTPATIENT
Start: 2018-02-14 | End: 2018-02-14 | Stop reason: HOSPADM

## 2018-02-14 RX ORDER — ACETAMINOPHEN 160 MG/5ML
975 SOLUTION ORAL ONCE
Status: COMPLETED | OUTPATIENT
Start: 2018-02-14 | End: 2018-02-14

## 2018-02-14 RX ORDER — MORPHINE SULFATE 2 MG/ML
2 INJECTION, SOLUTION INTRAMUSCULAR; INTRAVENOUS
Status: DISCONTINUED | OUTPATIENT
Start: 2018-02-14 | End: 2018-02-16 | Stop reason: HOSPADM

## 2018-02-14 RX ORDER — NALOXONE HCL 0.4 MG/ML
0.1 VIAL (ML) INJECTION
Status: DISCONTINUED | OUTPATIENT
Start: 2018-02-14 | End: 2018-02-16 | Stop reason: HOSPADM

## 2018-02-14 RX ORDER — SODIUM CHLORIDE, SODIUM LACTATE, POTASSIUM CHLORIDE, CALCIUM CHLORIDE 600; 310; 30; 20 MG/100ML; MG/100ML; MG/100ML; MG/100ML
9 INJECTION, SOLUTION INTRAVENOUS CONTINUOUS
Status: DISCONTINUED | OUTPATIENT
Start: 2018-02-14 | End: 2018-02-14 | Stop reason: HOSPADM

## 2018-02-14 RX ORDER — LABETALOL HYDROCHLORIDE 5 MG/ML
10 INJECTION, SOLUTION INTRAVENOUS
Status: DISCONTINUED | OUTPATIENT
Start: 2018-02-14 | End: 2018-02-16 | Stop reason: HOSPADM

## 2018-02-14 RX ORDER — SODIUM CHLORIDE, SODIUM LACTATE, POTASSIUM CHLORIDE, CALCIUM CHLORIDE 600; 310; 30; 20 MG/100ML; MG/100ML; MG/100ML; MG/100ML
150 INJECTION, SOLUTION INTRAVENOUS CONTINUOUS
Status: DISCONTINUED | OUTPATIENT
Start: 2018-02-14 | End: 2018-02-16 | Stop reason: HOSPADM

## 2018-02-14 RX ORDER — LIDOCAINE HYDROCHLORIDE 20 MG/ML
INJECTION, SOLUTION INFILTRATION; PERINEURAL AS NEEDED
Status: DISCONTINUED | OUTPATIENT
Start: 2018-02-14 | End: 2018-02-14 | Stop reason: SURG

## 2018-02-14 RX ORDER — MIDAZOLAM HYDROCHLORIDE 1 MG/ML
2 INJECTION INTRAMUSCULAR; INTRAVENOUS
Status: DISCONTINUED | OUTPATIENT
Start: 2018-02-14 | End: 2018-02-14 | Stop reason: HOSPADM

## 2018-02-14 RX ORDER — MAGNESIUM HYDROXIDE 1200 MG/15ML
LIQUID ORAL AS NEEDED
Status: DISCONTINUED | OUTPATIENT
Start: 2018-02-14 | End: 2018-02-14 | Stop reason: HOSPADM

## 2018-02-14 RX ORDER — ONDANSETRON 2 MG/ML
INJECTION INTRAMUSCULAR; INTRAVENOUS AS NEEDED
Status: DISCONTINUED | OUTPATIENT
Start: 2018-02-14 | End: 2018-02-14 | Stop reason: SURG

## 2018-02-14 RX ORDER — FAMOTIDINE 10 MG/ML
20 INJECTION, SOLUTION INTRAVENOUS ONCE
Status: DISCONTINUED | OUTPATIENT
Start: 2018-02-14 | End: 2018-02-14 | Stop reason: HOSPADM

## 2018-02-14 RX ORDER — LORAZEPAM 1 MG/1
1 TABLET ORAL EVERY 12 HOURS PRN
Status: DISCONTINUED | OUTPATIENT
Start: 2018-02-14 | End: 2018-02-16 | Stop reason: HOSPADM

## 2018-02-14 RX ORDER — ONDANSETRON 4 MG/1
4 TABLET, FILM COATED ORAL EVERY 4 HOURS PRN
Status: DISCONTINUED | OUTPATIENT
Start: 2018-02-14 | End: 2018-02-16 | Stop reason: HOSPADM

## 2018-02-14 RX ORDER — ONDANSETRON 2 MG/ML
4 INJECTION INTRAMUSCULAR; INTRAVENOUS EVERY 4 HOURS PRN
Status: DISCONTINUED | OUTPATIENT
Start: 2018-02-14 | End: 2018-02-16 | Stop reason: HOSPADM

## 2018-02-14 RX ORDER — CLONIDINE HYDROCHLORIDE 0.1 MG/1
0.1 TABLET ORAL EVERY 6 HOURS PRN
Status: DISCONTINUED | OUTPATIENT
Start: 2018-02-14 | End: 2018-02-16 | Stop reason: HOSPADM

## 2018-02-14 RX ORDER — FAMOTIDINE 10 MG/ML
20 INJECTION, SOLUTION INTRAVENOUS ONCE
Status: COMPLETED | OUTPATIENT
Start: 2018-02-14 | End: 2018-02-14

## 2018-02-14 RX ORDER — CYANOCOBALAMIN 1000 UG/ML
1000 INJECTION, SOLUTION INTRAMUSCULAR; SUBCUTANEOUS ONCE
Status: COMPLETED | OUTPATIENT
Start: 2018-02-15 | End: 2018-02-15

## 2018-02-14 RX ORDER — CEFAZOLIN SODIUM 2 G/100ML
2 INJECTION, SOLUTION INTRAVENOUS ONCE
Status: COMPLETED | OUTPATIENT
Start: 2018-02-14 | End: 2018-02-14

## 2018-02-14 RX ORDER — NALOXONE HCL 0.4 MG/ML
0.4 VIAL (ML) INJECTION
Status: DISCONTINUED | OUTPATIENT
Start: 2018-02-14 | End: 2018-02-16 | Stop reason: HOSPADM

## 2018-02-14 RX ADMIN — HYDROMORPHONE HYDROCHLORIDE 0.5 MG: 2 INJECTION INTRAMUSCULAR; INTRAVENOUS; SUBCUTANEOUS at 15:54

## 2018-02-14 RX ADMIN — CHLORHEXIDINE GLUCONATE 15 ML: 1.2 RINSE ORAL at 13:02

## 2018-02-14 RX ADMIN — ENOXAPARIN SODIUM 40 MG: 40 INJECTION SUBCUTANEOUS at 14:22

## 2018-02-14 RX ADMIN — KETOROLAC TROMETHAMINE 30 MG: 30 INJECTION, SOLUTION INTRAMUSCULAR at 20:18

## 2018-02-14 RX ADMIN — SUGAMMADEX 300 MG: 100 INJECTION, SOLUTION INTRAVENOUS at 15:18

## 2018-02-14 RX ADMIN — MIDAZOLAM 2 MG: 1 INJECTION INTRAMUSCULAR; INTRAVENOUS at 13:28

## 2018-02-14 RX ADMIN — HYDROMORPHONE HYDROCHLORIDE 0.5 MG: 2 INJECTION INTRAMUSCULAR; INTRAVENOUS; SUBCUTANEOUS at 17:25

## 2018-02-14 RX ADMIN — KETOROLAC TROMETHAMINE 30 MG: 30 INJECTION, SOLUTION INTRAMUSCULAR; INTRAVENOUS at 15:17

## 2018-02-14 RX ADMIN — FAMOTIDINE 20 MG: 10 INJECTION, SOLUTION INTRAVENOUS at 12:28

## 2018-02-14 RX ADMIN — PROPOFOL 200 MG: 10 INJECTION, EMULSION INTRAVENOUS at 14:30

## 2018-02-14 RX ADMIN — LIDOCAINE HYDROCHLORIDE 100 MG: 20 INJECTION, SOLUTION INFILTRATION; PERINEURAL at 14:30

## 2018-02-14 RX ADMIN — HYDROMORPHONE HYDROCHLORIDE 0.5 MG: 2 INJECTION INTRAMUSCULAR; INTRAVENOUS; SUBCUTANEOUS at 16:12

## 2018-02-14 RX ADMIN — SODIUM CHLORIDE, POTASSIUM CHLORIDE, SODIUM LACTATE AND CALCIUM CHLORIDE: 600; 310; 30; 20 INJECTION, SOLUTION INTRAVENOUS at 15:00

## 2018-02-14 RX ADMIN — SODIUM CHLORIDE, POTASSIUM CHLORIDE, SODIUM LACTATE AND CALCIUM CHLORIDE: 600; 310; 30; 20 INJECTION, SOLUTION INTRAVENOUS at 14:27

## 2018-02-14 RX ADMIN — HYOSCYAMINE SULFATE 125 MCG: 0.12 TABLET ORAL at 21:22

## 2018-02-14 RX ADMIN — FENTANYL CITRATE 50 MCG: 50 INJECTION INTRAMUSCULAR; INTRAVENOUS at 15:20

## 2018-02-14 RX ADMIN — ROCURONIUM BROMIDE 50 MG: 10 INJECTION INTRAVENOUS at 14:30

## 2018-02-14 RX ADMIN — METOCLOPRAMIDE 10 MG: 5 INJECTION, SOLUTION INTRAMUSCULAR; INTRAVENOUS at 20:18

## 2018-02-14 RX ADMIN — ALBUTEROL SULFATE 2.5 MG: 2.5 SOLUTION RESPIRATORY (INHALATION) at 21:05

## 2018-02-14 RX ADMIN — SODIUM CHLORIDE, POTASSIUM CHLORIDE, SODIUM LACTATE AND CALCIUM CHLORIDE 150 ML/HR: 600; 310; 30; 20 INJECTION, SOLUTION INTRAVENOUS at 20:17

## 2018-02-14 RX ADMIN — FAMOTIDINE 20 MG: 10 INJECTION INTRAVENOUS at 22:41

## 2018-02-14 RX ADMIN — FENTANYL CITRATE 50 MCG: 50 INJECTION INTRAMUSCULAR; INTRAVENOUS at 15:00

## 2018-02-14 RX ADMIN — HYDROMORPHONE HYDROCHLORIDE 0.5 MG: 10 INJECTION INTRAMUSCULAR; INTRAVENOUS; SUBCUTANEOUS at 23:31

## 2018-02-14 RX ADMIN — SCOLOPAMINE TRANSDERMAL SYSTEM 1 PATCH: 1 PATCH, EXTENDED RELEASE TRANSDERMAL at 12:27

## 2018-02-14 RX ADMIN — FENTANYL CITRATE 100 MCG: 50 INJECTION INTRAMUSCULAR; INTRAVENOUS at 14:30

## 2018-02-14 RX ADMIN — CLONIDINE HYDROCHLORIDE 0.1 MG: 0.1 TABLET ORAL at 23:30

## 2018-02-14 RX ADMIN — ONDANSETRON 4 MG: 2 INJECTION INTRAMUSCULAR; INTRAVENOUS at 15:15

## 2018-02-14 RX ADMIN — ACETAMINOPHEN 975 MG: 325 SOLUTION ORAL at 12:32

## 2018-02-14 RX ADMIN — HYDROMORPHONE HYDROCHLORIDE 0.5 MG: 10 INJECTION INTRAMUSCULAR; INTRAVENOUS; SUBCUTANEOUS at 21:18

## 2018-02-14 RX ADMIN — CEFAZOLIN SODIUM 2 G: 2 INJECTION, SOLUTION INTRAVENOUS at 14:23

## 2018-02-14 RX ADMIN — SODIUM CHLORIDE, POTASSIUM CHLORIDE, SODIUM LACTATE AND CALCIUM CHLORIDE 500 ML: 600; 310; 30; 20 INJECTION, SOLUTION INTRAVENOUS at 12:28

## 2018-02-14 NOTE — ANESTHESIA POSTPROCEDURE EVALUATION
Patient: Leena Mims    Procedure Summary     Date Anesthesia Start Anesthesia Stop Room / Location    02/14/18 [id number]  [facility] /  [facility]       Procedure Diagnosis Surgeon Provider    GASTRIC SLEEVE LAPAROSCOPIC (N/A Abdomen) BMI 40.0-44.9, adult  (BMI 40.0-44.9, adult [Z68.41]) MD Shawn Morrison Jr., MD          Anesthesia Type: general  Last vitals  BP   146/85 (02/14/18 1630)   Temp   36.1 °C (97 °F) (02/14/18 1534)   Pulse   76 (02/14/18 1630)   Resp   16 (02/14/18 1630)     SpO2   94 % (02/14/18 1630)     Post Anesthesia Care and Evaluation    Patient location during evaluation: bedside  Patient participation: complete - patient participated  Level of consciousness: sleepy but conscious  Pain score: 0  Pain management: adequate  Airway patency: patent  Anesthetic complications: No anesthetic complications    Cardiovascular status: acceptable  Respiratory status: acceptable  Hydration status: acceptable    Comments: /85  Pulse 76  Temp 36.1 °C (97 °F) (Temporal Artery )   Resp 16  SpO2 94%

## 2018-02-14 NOTE — ANESTHESIA PROCEDURE NOTES
Airway  Urgency: elective    Airway not difficult    General Information and Staff    Patient location during procedure: OR  Anesthesiologist: RENDER, MANUEL RAY  CRNA: NATY FISHER    Indications and Patient Condition  Indications for airway management: airway protection    Preoxygenated: yes  MILS not maintained throughout  Mask difficulty assessment: 1 - vent by mask    Final Airway Details  Final airway type: endotracheal airway      Successful airway: ETT  Cuffed: yes   Successful intubation technique: direct laryngoscopy  Facilitating devices/methods: intubating stylet  Endotracheal tube insertion site: oral  Blade: Tavarez  Blade size: #2  ETT size: 7.0 mm  Cormack-Lehane Classification: grade I - full view of glottis  Placement verified by: chest auscultation and capnometry   Cuff volume (mL): 10  Measured from: lips  Number of attempts at approach: 1    Additional Comments  Ett placed easily, appears atraumatic, dentition intact

## 2018-02-14 NOTE — OP NOTE
PREOPERATIVE DIAGNOSIS:  Morbid obesity with multiple comorbidities as referenced in the most recent history and physical.    POSTOPERATIVE DIAGNOSIS:    1:  Morbid obesity with multiple comorbidities as referenced in the most recent history and physical.  2:  Hiatal Hernia    PROCEDURES PERFORMED:  1.  Laparoscopic sleeve gastrectomy.  2.  Laparoscopic hiatal hernia repair.  3.  Tisseel application.     SURGEON:  Ej Coleman Jr., MD    ASSISTANT:  ARGENIS Sesay, [facility]    Surgery assisted and facilitated by a certified physician assistant, who directly resulted in a decreased operative time, anesthetic time, wound exposure, and possibly of an operative wound infection, thereby decreasing patient morbidity and ultimately total expenditures. The surgical assistant assisted in placement of trochars, take down of the gastrocolic omentum, short gastric vessels and dissection at the angle of His.  Also assisted in retraction of the stomach during stapling so as not to kink the gastric sleeve.  Also assisted in removing of the gastric specimen, closure of the fascial defect as well as closure of the skin incisions. They also assisted in retraction of the stomach during the hiatal hernia repair, dissection of the hernia sac and closure of the crura.      ANESTHESIA:  General endotracheal.    ESTIMATED BLOOD LOSS:   Less than 25 mL unless dictated below.    FLUIDS:  Crystalloids.    SPECIMENS:  None.    DRAINS:  None.    COUNTS:  Correct.    COMPLICATIONS:  None.    INDICATIONS:  This patient with morbid obesity and associated comorbidities presents for elective laparoscopic, possible open sleeve gastrectomy.  The patient has received medical clearance to proceed.  The patient has undergone our extensive educational process and consent process and wishes to proceed.    DESCRIPTION OF PROCEDURE:  The patient was brought to the operating room and placed supine upon the operating room table. SCD hose were placed.   The patient underwent uneventful general endotracheal anesthesia per the anesthesiology staff. The abdomen was prepped with ChloraPrep and draped in the usual sterile fashion.  An Ioban was used as well if not allergic. Anesthesia staff then passed a 36-Burkinan ViSiGi bougie into the stomach to decompress and then was pulled back to the mouth.    A 5-10 mm transverse incision was made a few centimeters above and to the left of the umbilicus and the peritoneal cavity entered under direct camera visualization using a 5 or 10 mm 0° laparoscope and an Optiview trocar.  The abdomen was then insufflated to a pressure of 15-16 mmHg with CO2 gas.  Exploratory laparoscopy revealed no evidence of injury from the entrance technique and no significant abnormalities were seen unless addendum dictated below.  An angled laparoscope was then used.  The patient was placed in reverse Trendelenburg position.  Under direct camera visualization a 5 mm trocar was placed in the right lateral subcostal position.  A 12 mm trocar was placed in the right midabdominal position.  A 5 mm trocar was placed in the left midabdominal position. A Navjot retractor was placed through an epigastric incision and used to elevate the left lobe of the liver.  The fat pad was elevated and the left sophy exposed.  At this point, approximately assisted along the greater curvature, the gastrocolic omentum was divided with the Enseal and this proceeded superiorly to the angle of His taking down the short gastric vessels.  All posterior attachments of the lesser sac and posterior aspect of the stomach to the pancreas were taken down as well.  The left sophy was exposed along its length.  Dissection then proceeded medially taking down the greater curvature with an Enseal until just proximal to the pylorus.  The 36-Burkinan ViSiGi bougie was passed back down into the stomach by anesthesia and the sleeve gastrectomy was then performed.  The 1st load was a black, thick  tissue load on the Bowie Flex stapler with Veritas Rhea-Strip and this was placed 3-4 cm proximal to the pylorus and up against the bougie pulling it anteriorly and posteriorly up against the bougie making sure not to narrow the incisura.  The next 5-7 loads were green with dual absorbable Veritas Rhea-Strips depending on the size of the stomach. Careful attention was made to stay about 1 cm from the esophagus.  Areas of the reinforced staple line were oversewn with absorbable sutures as needed for bleeding or questionable staple lines.  The gastric specimen was then removed from the 12 mm trocar site. The specimen was then opened up on a back table and the staple line was inspected and was intact.  If no abnormalities were seen in the specimen the gastric specimen was then discarded.  If abnormalities were seen the specimen was then sent off to pathology.   At this point, the sleeve was submerged under saline and using the ViSiGi bougie to insufflate the stomach, a leak test was performed.  This revealed the sleeve to be watertight, no air bubbles, no leak, and no bleeding seen from the staple lines and no significant abnormalities.  Irrigation fluid from the abdomen was then suctioned.  The gastric sleeve staple line was then treated with 4 mL Tisseel fibrin glue. The fascia at the 12 mm trocar site incision was closed with a single 0 Vicryl suture in a figure-of-eight fashion placed under direct laparoscopic camera visualization with a suture passer and tying the knot extracorporeally.  The fascia in the area was infiltrated with local anesthesia. All incisions were then infiltrated with local anesthetic. The remaining trocars were removed under direct camera visualization with no bleeding noted from their sites.  The abdomen was desufflated of gas. The skin in each incision was closed using 4-0 antibiotic impregnated Monocryl in a subcuticular stitch followed by Dermabond.  The patient tolerated the procedure  well without complication and was taken to the recovery room in stable condition.  All sponge, needle, and instrument counts were correct.    The patient was noted to have a hiatal hernia.  The phrenoesophageal membrane was opened up with electrocautery and the right and left crura were cleaned off using sharp and blunt dissection.  The hernia sac was completely dissected free.  The intra-abdominal esophagus was now approximately 3 cm in length.  The base of the left sophy was exposed to evaluate for a posterior defect and lipomas.The short gastric vessels were taken down using the Enseal device and the fundus was removed as dictated above.  The hiatus was then reapproximated with 0 ethibond sutures in a figure-of-eight fashion.

## 2018-02-14 NOTE — ANESTHESIA PREPROCEDURE EVALUATION
Anesthesia Evaluation     NPO Solid Status: > 8 hours             Airway   Mallampati: III  Neck ROM: full  no difficulty expected  Dental    (+) edentulous    Pulmonary - normal exam   (+) COPD, asthma, sleep apnea,   Cardiovascular   Exercise tolerance: good (4-7 METS)    ECG reviewed  Rhythm: regular    (-) murmur, carotid bruits    ROS comment: Echo no AS  Treadmill  Neg for ischemia    Neuro/Psych  (+) dizziness/light headedness, numbness, psychiatric history,     GI/Hepatic/Renal/Endo    (+) obesity, morbid obesity, GERD,     Musculoskeletal     Abdominal   (+) obese,    Substance History      OB/GYN          Other                        Anesthesia Plan    ASA 3     general   (  D/W R&B of GA including but not limited to: heart, lung, liver, kidney, neurologic problems, positioning injuries, dental damage, corneal abrasion and TMJ.  .    No NSAIDS)

## 2018-02-15 LAB
ALBUMIN SERPL-MCNC: 3.3 G/DL (ref 3.5–5.2)
ALBUMIN/GLOB SERPL: 1.1 G/DL
ALP SERPL-CCNC: 78 U/L (ref 39–117)
ALT SERPL W P-5'-P-CCNC: 34 U/L (ref 1–33)
ANION GAP SERPL CALCULATED.3IONS-SCNC: 11.1 MMOL/L
AST SERPL-CCNC: 28 U/L (ref 1–32)
BASOPHILS # BLD AUTO: 0.03 10*3/MM3 (ref 0–0.2)
BASOPHILS NFR BLD AUTO: 0.2 % (ref 0–1.5)
BILIRUB SERPL-MCNC: 0.2 MG/DL (ref 0.1–1.2)
BUN BLD-MCNC: 7 MG/DL (ref 6–20)
BUN/CREAT SERPL: 15.9 (ref 7–25)
CALCIUM SPEC-SCNC: 8.9 MG/DL (ref 8.6–10.5)
CHLORIDE SERPL-SCNC: 102 MMOL/L (ref 98–107)
CO2 SERPL-SCNC: 22.9 MMOL/L (ref 22–29)
CREAT BLD-MCNC: 0.44 MG/DL (ref 0.57–1)
DEPRECATED RDW RBC AUTO: 46.3 FL (ref 37–54)
EOSINOPHIL # BLD AUTO: 0.09 10*3/MM3 (ref 0–0.7)
EOSINOPHIL NFR BLD AUTO: 0.6 % (ref 0.3–6.2)
ERYTHROCYTE [DISTWIDTH] IN BLOOD BY AUTOMATED COUNT: 12.9 % (ref 11.7–13)
GFR SERPL CREATININE-BSD FRML MDRD: >150 ML/MIN/1.73
GLOBULIN UR ELPH-MCNC: 3 GM/DL
GLUCOSE BLD-MCNC: 119 MG/DL (ref 65–99)
HCT VFR BLD AUTO: 42.1 % (ref 35.6–45.5)
HGB BLD-MCNC: 13.3 G/DL (ref 11.9–15.5)
IMM GRANULOCYTES # BLD: 0.04 10*3/MM3 (ref 0–0.03)
IMM GRANULOCYTES NFR BLD: 0.3 % (ref 0–0.5)
LYMPHOCYTES # BLD AUTO: 2.01 10*3/MM3 (ref 0.9–4.8)
LYMPHOCYTES NFR BLD AUTO: 13.3 % (ref 19.6–45.3)
MAGNESIUM SERPL-MCNC: 1.9 MG/DL (ref 1.6–2.6)
MCH RBC QN AUTO: 31 PG (ref 26.9–32)
MCHC RBC AUTO-ENTMCNC: 31.6 G/DL (ref 32.4–36.3)
MCV RBC AUTO: 98.1 FL (ref 80.5–98.2)
MONOCYTES # BLD AUTO: 0.7 10*3/MM3 (ref 0.2–1.2)
MONOCYTES NFR BLD AUTO: 4.6 % (ref 5–12)
NEUTROPHILS # BLD AUTO: 12.28 10*3/MM3 (ref 1.9–8.1)
NEUTROPHILS NFR BLD AUTO: 81 % (ref 42.7–76)
PHOSPHATE SERPL-MCNC: 3.5 MG/DL (ref 2.5–4.5)
PLATELET # BLD AUTO: 230 10*3/MM3 (ref 140–500)
PMV BLD AUTO: 10.9 FL (ref 6–12)
POTASSIUM BLD-SCNC: 4.1 MMOL/L (ref 3.5–5.2)
PROT SERPL-MCNC: 6.3 G/DL (ref 6–8.5)
RBC # BLD AUTO: 4.29 10*6/MM3 (ref 3.9–5.2)
SODIUM BLD-SCNC: 136 MMOL/L (ref 136–145)
WBC NRBC COR # BLD: 15.15 10*3/MM3 (ref 4.5–10.7)

## 2018-02-15 PROCEDURE — 25010000002 KETOROLAC TROMETHAMINE PER 15 MG: Performed by: SURGERY

## 2018-02-15 PROCEDURE — 25010000002 PYRIDOXINE PER 100 MG: Performed by: SURGERY

## 2018-02-15 PROCEDURE — 84100 ASSAY OF PHOSPHORUS: CPT | Performed by: SURGERY

## 2018-02-15 PROCEDURE — 85025 COMPLETE CBC W/AUTO DIFF WBC: CPT | Performed by: SURGERY

## 2018-02-15 PROCEDURE — 94799 UNLISTED PULMONARY SVC/PX: CPT

## 2018-02-15 PROCEDURE — 83735 ASSAY OF MAGNESIUM: CPT | Performed by: SURGERY

## 2018-02-15 PROCEDURE — 25010000002 HYDRALAZINE PER 20 MG: Performed by: SURGERY

## 2018-02-15 PROCEDURE — 25010000002 ENOXAPARIN PER 10 MG: Performed by: SURGERY

## 2018-02-15 PROCEDURE — 25010000002 METOCLOPRAMIDE PER 10 MG: Performed by: SURGERY

## 2018-02-15 PROCEDURE — 25010000002 CYANOCOBALAMIN PER 1000 MCG: Performed by: SURGERY

## 2018-02-15 PROCEDURE — 80053 COMPREHEN METABOLIC PANEL: CPT | Performed by: SURGERY

## 2018-02-15 PROCEDURE — 25010000002 THIAMINE PER 100 MG: Performed by: SURGERY

## 2018-02-15 RX ORDER — HYDRALAZINE HYDROCHLORIDE 20 MG/ML
10 INJECTION INTRAMUSCULAR; INTRAVENOUS ONCE
Status: COMPLETED | OUTPATIENT
Start: 2018-02-15 | End: 2018-02-15

## 2018-02-15 RX ADMIN — HYOSCYAMINE SULFATE 125 MCG: 0.12 TABLET ORAL at 11:31

## 2018-02-15 RX ADMIN — Medication 10 MG: at 01:59

## 2018-02-15 RX ADMIN — Medication 10 MG: at 05:13

## 2018-02-15 RX ADMIN — METOCLOPRAMIDE 10 MG: 5 INJECTION, SOLUTION INTRAMUSCULAR; INTRAVENOUS at 02:50

## 2018-02-15 RX ADMIN — SODIUM CHLORIDE, POTASSIUM CHLORIDE, SODIUM LACTATE AND CALCIUM CHLORIDE 150 ML/HR: 600; 310; 30; 20 INJECTION, SOLUTION INTRAVENOUS at 21:12

## 2018-02-15 RX ADMIN — HYDROMORPHONE HYDROCHLORIDE 0.5 MG: 10 INJECTION INTRAMUSCULAR; INTRAVENOUS; SUBCUTANEOUS at 02:49

## 2018-02-15 RX ADMIN — METOCLOPRAMIDE 10 MG: 5 INJECTION, SOLUTION INTRAMUSCULAR; INTRAVENOUS at 19:57

## 2018-02-15 RX ADMIN — KETOROLAC TROMETHAMINE 30 MG: 30 INJECTION, SOLUTION INTRAMUSCULAR at 17:14

## 2018-02-15 RX ADMIN — ENOXAPARIN SODIUM 40 MG: 40 INJECTION SUBCUTANEOUS at 09:13

## 2018-02-15 RX ADMIN — PYRIDOXINE HYDROCHLORIDE 250 ML/HR: 100 INJECTION, SOLUTION INTRAMUSCULAR; INTRAVENOUS at 00:26

## 2018-02-15 RX ADMIN — CYANOCOBALAMIN 1000 MCG: 1000 INJECTION, SOLUTION INTRAMUSCULAR at 09:14

## 2018-02-15 RX ADMIN — HYDROCODONE BITARTRATE AND ACETAMINOPHEN 15 ML: 7.5; 325 SOLUTION ORAL at 01:19

## 2018-02-15 RX ADMIN — HYOSCYAMINE SULFATE 125 MCG: 0.12 TABLET ORAL at 20:02

## 2018-02-15 RX ADMIN — HYDROMORPHONE HYDROCHLORIDE 0.5 MG: 10 INJECTION INTRAMUSCULAR; INTRAVENOUS; SUBCUTANEOUS at 04:47

## 2018-02-15 RX ADMIN — HYOSCYAMINE SULFATE 125 MCG: 0.12 TABLET ORAL at 09:14

## 2018-02-15 RX ADMIN — KETOROLAC TROMETHAMINE 30 MG: 30 INJECTION, SOLUTION INTRAMUSCULAR at 09:14

## 2018-02-15 RX ADMIN — HYOSCYAMINE SULFATE 125 MCG: 0.12 TABLET ORAL at 17:15

## 2018-02-15 RX ADMIN — HYDRALAZINE HYDROCHLORIDE 10 MG: 20 INJECTION INTRAMUSCULAR; INTRAVENOUS at 06:34

## 2018-02-15 RX ADMIN — METOCLOPRAMIDE 10 MG: 5 INJECTION, SOLUTION INTRAMUSCULAR; INTRAVENOUS at 09:14

## 2018-02-15 RX ADMIN — FAMOTIDINE 20 MG: 10 INJECTION INTRAVENOUS at 09:14

## 2018-02-15 RX ADMIN — SODIUM CHLORIDE, POTASSIUM CHLORIDE, SODIUM LACTATE AND CALCIUM CHLORIDE 150 ML/HR: 600; 310; 30; 20 INJECTION, SOLUTION INTRAVENOUS at 14:14

## 2018-02-15 RX ADMIN — HYDROMORPHONE HYDROCHLORIDE 2 MG: 2 TABLET ORAL at 11:18

## 2018-02-15 RX ADMIN — HYDROMORPHONE HYDROCHLORIDE 2 MG: 2 TABLET ORAL at 07:26

## 2018-02-15 RX ADMIN — ALBUTEROL SULFATE 2.5 MG: 2.5 SOLUTION RESPIRATORY (INHALATION) at 07:35

## 2018-02-15 RX ADMIN — METOCLOPRAMIDE 10 MG: 5 INJECTION, SOLUTION INTRAMUSCULAR; INTRAVENOUS at 14:13

## 2018-02-15 RX ADMIN — HYDROMORPHONE HYDROCHLORIDE 2 MG: 2 TABLET ORAL at 19:55

## 2018-02-15 RX ADMIN — HYDROMORPHONE HYDROCHLORIDE 2 MG: 2 TABLET ORAL at 15:21

## 2018-02-15 RX ADMIN — KETOROLAC TROMETHAMINE 30 MG: 30 INJECTION, SOLUTION INTRAMUSCULAR at 11:31

## 2018-02-15 NOTE — PLAN OF CARE
Problem: Patient Care Overview (Adult)  Goal: Plan of Care Review  Outcome: Ongoing (interventions implemented as appropriate)   02/15/18 3037   Coping/Psychosocial Response Interventions   Plan Of Care Reviewed With patient;spouse   Patient Care Overview   Progress no change   Outcome Evaluation   Outcome Summary/Follow up Plan bp controlled today, pain meds given as needed with some relief, up ambulating, voiding well, vss will continue to monitor      Goal: Adult Individualization and Mutuality  Outcome: Ongoing (interventions implemented as appropriate)    Goal: Discharge Needs Assessment  Outcome: Ongoing (interventions implemented as appropriate)      Problem: Perioperative Period (Adult)  Goal: Signs and Symptoms of Listed Potential Problems Will be Absent or Manageable (Perioperative Period)  Outcome: Ongoing (interventions implemented as appropriate)      Problem: Bariatric Surgery (Open/Laparoscopic) (Adult,Pediatric)  Goal: Signs and Symptoms of Listed Potential Problems Will be Absent or Manageable (Bariatric Surgery)  Outcome: Ongoing (interventions implemented as appropriate)

## 2018-02-15 NOTE — PLAN OF CARE
Problem: Patient Care Overview (Adult)  Goal: Plan of Care Review  Outcome: Ongoing (interventions implemented as appropriate)   02/15/18 0730   Coping/Psychosocial Response Interventions   Plan Of Care Reviewed With patient   Patient Care Overview   Progress no change   Outcome Evaluation   Outcome Summary/Follow up Plan BP elevated most of shift, meds given. Pain level 8-9 epigastric, left shoulder pains, medicated per orders. MD aware of changes. Pt ambulating, SCDs worn in bed. BP better this morning after dose of hydralazine given.

## 2018-02-16 VITALS
HEART RATE: 101 BPM | HEIGHT: 63 IN | DIASTOLIC BLOOD PRESSURE: 84 MMHG | WEIGHT: 224.3 LBS | OXYGEN SATURATION: 92 % | RESPIRATION RATE: 20 BRPM | SYSTOLIC BLOOD PRESSURE: 156 MMHG | TEMPERATURE: 98.2 F | BODY MASS INDEX: 39.74 KG/M2

## 2018-02-16 LAB
ANION GAP SERPL CALCULATED.3IONS-SCNC: 16.4 MMOL/L
BASOPHILS # BLD AUTO: 0.03 10*3/MM3 (ref 0–0.2)
BASOPHILS NFR BLD AUTO: 0.2 % (ref 0–1.5)
BUN BLD-MCNC: 4 MG/DL (ref 6–20)
BUN/CREAT SERPL: 9.5 (ref 7–25)
CALCIUM SPEC-SCNC: 9.1 MG/DL (ref 8.6–10.5)
CHLORIDE SERPL-SCNC: 104 MMOL/L (ref 98–107)
CO2 SERPL-SCNC: 20.6 MMOL/L (ref 22–29)
CREAT BLD-MCNC: 0.42 MG/DL (ref 0.57–1)
DEPRECATED RDW RBC AUTO: 46.8 FL (ref 37–54)
EOSINOPHIL # BLD AUTO: 0.37 10*3/MM3 (ref 0–0.7)
EOSINOPHIL NFR BLD AUTO: 3 % (ref 0.3–6.2)
ERYTHROCYTE [DISTWIDTH] IN BLOOD BY AUTOMATED COUNT: 13.1 % (ref 11.7–13)
GFR SERPL CREATININE-BSD FRML MDRD: >150 ML/MIN/1.73
GLUCOSE BLD-MCNC: 119 MG/DL (ref 65–99)
HCT VFR BLD AUTO: 41.1 % (ref 35.6–45.5)
HGB BLD-MCNC: 13.1 G/DL (ref 11.9–15.5)
IMM GRANULOCYTES # BLD: 0.03 10*3/MM3 (ref 0–0.03)
IMM GRANULOCYTES NFR BLD: 0.2 % (ref 0–0.5)
LYMPHOCYTES # BLD AUTO: 1.8 10*3/MM3 (ref 0.9–4.8)
LYMPHOCYTES NFR BLD AUTO: 14.6 % (ref 19.6–45.3)
MCH RBC QN AUTO: 31 PG (ref 26.9–32)
MCHC RBC AUTO-ENTMCNC: 31.9 G/DL (ref 32.4–36.3)
MCV RBC AUTO: 97.2 FL (ref 80.5–98.2)
MONOCYTES # BLD AUTO: 0.76 10*3/MM3 (ref 0.2–1.2)
MONOCYTES NFR BLD AUTO: 6.2 % (ref 5–12)
NEUTROPHILS # BLD AUTO: 9.33 10*3/MM3 (ref 1.9–8.1)
NEUTROPHILS NFR BLD AUTO: 75.8 % (ref 42.7–76)
PLATELET # BLD AUTO: 213 10*3/MM3 (ref 140–500)
PMV BLD AUTO: 10.8 FL (ref 6–12)
POTASSIUM BLD-SCNC: 3.3 MMOL/L (ref 3.5–5.2)
RBC # BLD AUTO: 4.23 10*6/MM3 (ref 3.9–5.2)
SODIUM BLD-SCNC: 141 MMOL/L (ref 136–145)
WBC NRBC COR # BLD: 12.32 10*3/MM3 (ref 4.5–10.7)

## 2018-02-16 PROCEDURE — 94799 UNLISTED PULMONARY SVC/PX: CPT

## 2018-02-16 PROCEDURE — 25010000002 ENOXAPARIN PER 10 MG: Performed by: SURGERY

## 2018-02-16 PROCEDURE — 25010000002 DIPHENHYDRAMINE PER 50 MG: Performed by: SURGERY

## 2018-02-16 PROCEDURE — 25010000002 METOCLOPRAMIDE PER 10 MG: Performed by: SURGERY

## 2018-02-16 PROCEDURE — 85025 COMPLETE CBC W/AUTO DIFF WBC: CPT | Performed by: NURSE PRACTITIONER

## 2018-02-16 PROCEDURE — 80048 BASIC METABOLIC PNL TOTAL CA: CPT | Performed by: NURSE PRACTITIONER

## 2018-02-16 RX ORDER — POTASSIUM CHLORIDE 750 MG/1
40 CAPSULE, EXTENDED RELEASE ORAL ONCE
Status: COMPLETED | OUTPATIENT
Start: 2018-02-16 | End: 2018-02-16

## 2018-02-16 RX ORDER — ONDANSETRON 4 MG/1
4 TABLET, FILM COATED ORAL EVERY 8 HOURS PRN
Qty: 10 TABLET | Refills: 0 | Status: SHIPPED | OUTPATIENT
Start: 2018-02-16 | End: 2018-03-21

## 2018-02-16 RX ADMIN — HYDROMORPHONE HYDROCHLORIDE 2 MG: 2 TABLET ORAL at 06:12

## 2018-02-16 RX ADMIN — POTASSIUM CHLORIDE 40 MEQ: 750 CAPSULE, EXTENDED RELEASE ORAL at 07:17

## 2018-02-16 RX ADMIN — HYDROMORPHONE HYDROCHLORIDE 2 MG: 2 TABLET ORAL at 10:12

## 2018-02-16 RX ADMIN — DIPHENHYDRAMINE HYDROCHLORIDE 25 MG: 50 INJECTION, SOLUTION INTRAMUSCULAR; INTRAVENOUS at 01:45

## 2018-02-16 RX ADMIN — HYDROMORPHONE HYDROCHLORIDE 2 MG: 2 TABLET ORAL at 01:04

## 2018-02-16 RX ADMIN — ALBUTEROL SULFATE 2.5 MG: 2.5 SOLUTION RESPIRATORY (INHALATION) at 06:41

## 2018-02-16 RX ADMIN — HYDROCODONE BITARTRATE AND ACETAMINOPHEN 15 ML: 7.5; 325 SOLUTION ORAL at 03:59

## 2018-02-16 RX ADMIN — ENOXAPARIN SODIUM 40 MG: 40 INJECTION SUBCUTANEOUS at 08:11

## 2018-02-16 RX ADMIN — CLONIDINE HYDROCHLORIDE 0.1 MG: 0.1 TABLET ORAL at 01:45

## 2018-02-16 RX ADMIN — FAMOTIDINE 20 MG: 10 INJECTION INTRAVENOUS at 01:48

## 2018-02-16 RX ADMIN — SODIUM CHLORIDE, POTASSIUM CHLORIDE, SODIUM LACTATE AND CALCIUM CHLORIDE 150 ML/HR: 600; 310; 30; 20 INJECTION, SOLUTION INTRAVENOUS at 04:14

## 2018-02-16 RX ADMIN — METOCLOPRAMIDE 10 MG: 5 INJECTION, SOLUTION INTRAMUSCULAR; INTRAVENOUS at 04:13

## 2018-02-16 RX ADMIN — HYDROCODONE BITARTRATE AND ACETAMINOPHEN 15 ML: 7.5; 325 SOLUTION ORAL at 08:23

## 2018-02-16 RX ADMIN — HYOSCYAMINE SULFATE 125 MCG: 0.12 TABLET ORAL at 08:11

## 2018-02-16 NOTE — PAYOR COMM NOTE
"Carole Mims (47 y.o. Female)                                        REF# [id number]                     CONTACT=   JAN LEOS                                 FAX   [phone]                     [phone]                                                         DISCHG SUM ATTACHED           Date of Birth Social Security Number Address Home Phone MRN    1970  [address] [place] [address]  [id number]    Rastafari Marital Status          Yazidi        Admission Date Admission Type Admitting Provider Attending Provider Department, Room/Bed    2/14/18 Elective Ej Coleman Jr., MD  [address], [address]/1    Discharge Date Discharge Disposition Discharge Destination        2/16/2018 Home or Self Care             Attending Provider: (none)    Allergies:  Aspirin, Celebrex [Celecoxib], Contrast Dye, Penicillins    Isolation:  None   Infection:  None   Code Status:  FULL    Ht:  160 cm (63\")   Wt:  102 kg (224 lb 4.8 oz)    Admission Cmt:  None   Principal Problem:  BMI 40.0-44.9, adult [Z68.41] More...                 Active Insurance as of 2/14/2018     Primary Coverage     Payor Plan Insurance Group Employer/Plan Group    [employer] MEDICAID [employer] [id number]     Payor Plan Address Payor Plan Phone Number Effective From Effective To    PO  [phone] 4/24/2017     [place] [address]       Subscriber Name Subscriber Birth Date Member ID       CAROLE MIMS 1970 [id number]                 Emergency Contacts      (Rel.) Home Phone Work Phone Mobile Phone    Vitaly Mims (Spouse) -- -- [phone]            Insurance Information                [employer] MEDICAID/[employer] Phone: [phone]    Subscriber: Carole Mims Subscriber#: [id number]    Group#: [id number] Precert#:              Discharge Summary      Ej Coleman Jr., MD at 2/16/2018  6:34 AM          Discharge Summary    Patient name: Carole Mims    Medical " "record number: [id number]    Admission date: 2/14/2018  Discharge date:      Attending physician: Dr. Ej Coleman    Primary care physician: Telma Tavera MD    Referring physician: Ej Coleman Jr., MD  [address] [address]    Condition on discharge: Stable    Primary Diagnoses:  Morbid obesity with co-morbidities    Operative Procedure:  Lap gastric sleeve     Leena Mims  is post op day two status post procedure listed. Patient denies shortness of air and lower extremity pain. Feels better than yesterday. No vomiting this am. Ambulating well and using incentive spirometer.          /84 (BP Location: Right arm, Patient Position: Lying)  Pulse 82  Temp 99.6 °F (37.6 °C) (Oral)   Resp 18  Ht 160 cm (63\")  Wt 102 kg (224 lb 4.8 oz)  SpO2 94%  BMI 39.73 kg/m2    General:  alert, appears stated age and cooperative   Abdomen: soft, bowel sounds active, appropriate tenderness   Incision:   healing well, no drainage, no erythema, no hernia, no seroma, no swelling, no dehiscence, incision well approximated   Heart: Regular rate   Lungs: Clear to auscultation bilaterally     I reviewed the patient's new clinical results.     Lab Results (last 24 hours)     Procedure Component Value Units Date/Time    CBC & Differential [[id number]] Collected:  02/16/18 0411    Specimen:  Blood Updated:  02/16/18 0445    Narrative:       The following orders were created for panel order CBC & Differential.  Procedure                               Abnormality         Status                     ---------                               -----------         ------                     CBC Auto Differential[[id number]]        Abnormal            Final result                 Please view results for these tests on the individual orders.    CBC Auto Differential [[id number]]  (Abnormal) Collected:  02/16/18 0411    Specimen:  Blood Updated:  02/16/18 0445     WBC 12.32 (H) 10*3/mm3      RBC 4.23 10*6/mm3  "     Hemoglobin 13.1 g/dL      Hematocrit 41.1 %      MCV 97.2 fL      MCH 31.0 pg      MCHC 31.9 (L) g/dL      RDW 13.1 (H) %      RDW-SD 46.8 fl      MPV 10.8 fL      Platelets 213 10*3/mm3      Neutrophil % 75.8 %      Lymphocyte % 14.6 (L) %      Monocyte % 6.2 %      Eosinophil % 3.0 %      Basophil % 0.2 %      Immature Grans % 0.2 %      Neutrophils, Absolute 9.33 (H) 10*3/mm3      Lymphocytes, Absolute 1.80 10*3/mm3      Monocytes, Absolute 0.76 10*3/mm3      Eosinophils, Absolute 0.37 10*3/mm3      Basophils, Absolute 0.03 10*3/mm3      Immature Grans, Absolute 0.03 10*3/mm3     Basic Metabolic Panel [[id number]]  (Abnormal) Collected:  02/16/18 0411    Specimen:  Blood Updated:  02/16/18 0506     Glucose 119 (H) mg/dL      BUN 4 (L) mg/dL      Creatinine 0.42 (L) mg/dL      Sodium 141 mmol/L      Potassium 3.3 (L) mmol/L      Chloride 104 mmol/L      CO2 20.6 (L) mmol/L      Calcium 9.1 mg/dL      eGFR Non African Amer >150 mL/min/1.73      BUN/Creatinine Ratio 9.5     Anion Gap 16.4 mmol/L     Narrative:       GFR Normal >60  Chronic Kidney Disease <60  Kidney Failure <15             Assessment:      Doing well postoperatively.      Plan:   1. Continue Stage 1 diet  2. Continue with ambulation and Incentive spirometry  3. Plan for d/c home    Patient was seen and examined by Dr. Coleman.    Hospital Course: The patient is a very pleasant 47 y.o. female that was admitted to the hospital with morbid obesity who underwent above procedure.  Postoperatively the patient was transferred to the bariatric unit per protocol.  Patient remained afebrile and hemodynamically stable.  Patient was up ambulating well and using incentive spirometer.  Postoperatively day 1 patient was started on stage I bariatric diet and continued with good ambulation.  Lovenox was continued.  Patient kept overnight for pain management. POD#2 patient feeling much better with decreased pain and ambulating well and tolerating diet. Patient was  then discharged home.        Discharge medications:    Leena Mims   Home Medication Instructions BRITTNEY:[id number]    Printed on:02/16/18 0634   Medication Information                      ALBUTEROL IN  Inhale 1 puff As Needed.             BREO ELLIPTA 100-25 MCG/INH aerosol powder   INL 1 PUFF PO D             cyclobenzaprine (FLEXERIL) 10 MG tablet  Take 10 mg by mouth 2 (Two) Times a Day As Needed.             dicyclomine (BENTYL) 10 MG capsule  Take 10 mg by mouth 4 (four) times a day before meals and nightly.             folic acid-pyridoxine-cyanocobalamin (FOLBIC) 2.5-25-2 MG tablet tablet  Take 1 tablet by mouth Daily. Begin two weeks before surgery             HYDROcodone-acetaminophen (HYCET) 7.5-325 MG/15ML solution  Take 15 mL by mouth Every 4 (Four) Hours As Needed for Moderate Pain  for up to 3 days.             montelukast (SINGULAIR) 10 MG tablet  Take 10 mg by mouth every night.             omeprazole (priLOSEC) 20 MG capsule  TAKE 1 CAPSULE BY MOUTH DAILY             ondansetron (ZOFRAN) 4 MG tablet  Take 1 tablet by mouth Every 8 (Eight) Hours As Needed for Nausea or Vomiting.                 Discharge instructions:  Per Bariatric manual; per our protocol      Follow-up appointment: Follow up with Dr. Coleman in the office as scheduled.  If not already scheduled call for appointment at [phone].       Electronically signed by Ej Coleman Jr., MD at 2/16/2018  6:36 AM

## 2018-02-16 NOTE — PLAN OF CARE
Problem: Patient Care Overview (Adult)  Goal: Plan of Care Review  Outcome: Ongoing (interventions implemented as appropriate)   02/16/18 0605   Coping/Psychosocial Response Interventions   Plan Of Care Reviewed With patient   Patient Care Overview   Progress improving   Outcome Evaluation   Outcome Summary/Follow up Plan Clonidine given 1x for bp. PRN pain meds given. Ambulating. VSS. No c/o nausea. Continue to monitor.      Goal: Adult Individualization and Mutuality  Outcome: Ongoing (interventions implemented as appropriate)    Goal: Discharge Needs Assessment  Outcome: Ongoing (interventions implemented as appropriate)      Problem: Perioperative Period (Adult)  Goal: Signs and Symptoms of Listed Potential Problems Will be Absent or Manageable (Perioperative Period)  Outcome: Ongoing (interventions implemented as appropriate)      Problem: Bariatric Surgery (Open/Laparoscopic) (Adult,Pediatric)  Goal: Signs and Symptoms of Listed Potential Problems Will be Absent or Manageable (Bariatric Surgery)  Outcome: Ongoing (interventions implemented as appropriate)

## 2018-02-16 NOTE — NURSING NOTE
Pt being discharged.  did not feel good last night and is now a patient in the ER. No decision has been made for him whether he will be admitted or not. Patient aware she cannot drive today per MDO. Received pain medication this am. Patient also aware if her  is admitted she will need to find a ride home. RN educated patient that decision may take a while, and the pain medication is to be filled by her pharmacy and that the ER cannot give her pain medication. Patient states she might be able to have her mother come get her.

## 2018-02-16 NOTE — DISCHARGE INSTRUCTIONS
GOING HOME AFTER GASTRIC SLEEVE/ GASTRIC BYPASS SURGERY  [facility] Weight Loss: Post-Operative Information/Instructions  Ej Coleman Jr., MD  General Patient Instructions for Discharge   - Call Surgeon's office at [phone] for follow-up appointment.    - Be sure you, the patient, have a follow-up appointment to be seen within three (3) days after discharge. If not, please call [phone] to schedule an appointment. If you are discharged on a Saturday or Sunday, please call Monday to schedule the appointment.  - Contact the Surgeon at [phone] for any questions or concerns, including temperature greater than or equal to 101F, shortness of breath, leg swelling, redness at incision sites, nausea, vomiting, chills, or problems or questions.    - Follow the Gastric Stage 1 Diet    à Clear liquids, room temperature, sugar-free, caffeine-free, non-carbonated, 70 grams of protein, No Straws.  - You may shower. No tub bath for 2 weeks.  - No lifting, pushing, pulling, or tugging >25 pounds for 3 weeks.  - Ambulate 4 x per day minimum, increase distance daily.  - For the next several weeks, you are at an increased risk for blood clot formation. Therefore, you should walk regularly. You should not sit for prolonged periods of time, more than 45 minutes, without getting up and walking for 5-10 minutes. This includes any car rides, including the drive home from the hospital. If driving any distance greater than 30 miles over the next two (2) weeks, stop every 30-45 minutes and walk for 5-10 minutes each time.  - Continue using Incentive Spirometer and coughing exercises at least every two (2) hours while awake for one week.  - If you, the patient, use CPAP/BIPAP for diagnosis of sleep apnea, you may resume use tonight at home.  - No driving or operating machinery allowed while taking narcotic (prescription) pain medication, and until you feel comfortable forcefully applying the brakes if needed. (This  usually takes more than 3 days.)    - Make an appointment with your Primary Care Physician within one week post-op to look at your home medications for possible changes or discontinuity.   Medications  - The nurse will provide a list of medications for you to continue at home   - If you received a Lovenox (Enoxaparin) or Apixiban (Eliquis) prescription at pre-op visit with Surgeon, start taking the medicine the morning after discharge unless directed otherwise.    - If you were prescribed Lovenox (Enoxaparin), review the education/teaching material/video with the nurse.    - Take post op pain meds as prescribed as needed.   - Continue Foltx until finished.   - Start Actigall (Ursodiol) one (1) week after surgery if patient still has gallbladder. You should have been given a prescription at your pre-op visit. Contact the office if you do not have the prescription.   - Start bariatric vitamin regimen as instructed in pre-op education with bariatric coordinator.    - Zegerid or Prilosec OTC (or generic) by mouth once daily for four (4) weeks unless you are already taking a proton pump inhibitor as home medication. Follow dosing instructions on package.   Nausea/Vomiting:  The following are possible causes for nausea/vomiting:  - Drinking too much or too fast.  - Sinus drainage/post nasal drip for allergy sufferers (you may take Sudafed, Claritin, Tylenol Sinus/Allergy, or other decongestants and nose sprays to help with this discomfort).  - Low blood sugar (sweating, shaky, irritable, weakness, dizzy or tunnel-vision) - treatment is to sip 100% fruit juice - no sugar added until symptoms subside.  - Acid in fruit juice - (may dilute with water or avoid).  - Eating or drinking something that is not on clear liquid (stage 1) diet.  Any nausea/vomiting that prohibits you from keeping fluids down for greater than 24 hours requires a call to the surgeon's office.  Urine:  Use your urine color as a guide to determine if you  are drinking enough fluid. The darker the urine, the more fluids you need to drink. Urine should be clear to light yellow if you are getting enough fluid. If you should experience frequency, burning or pain with urination, blood in urine, contact us or your primary care physician for possible UTI (urinary tract infection), which could require antibiotics (liquid preferred).  Bowel Movements:  You may not have a bowel movement for 2-5 days after going home. You may then experience liquid, runny or loose stools for approximately 3-4 weeks following surgery. This would require you to drink even more fluids to prevent dehydration. Some patients may experience constipation, which can be treated with increased fluids, drinking warm liquids, increased activity and the use of a Fleets Enema, Milk of Magnesia, or suppositories. The first couple of bowel movements could be bloody, tarry black or dark maroon in color. This is OK as long as the stool returns to a normal color in 1-2 days. If however, you have frequent or a large amount of bloody or tarry black stools and/or become light-headed or dizzy, you may be bleeding and require urgent attention. Please call us right away.  Abdominal Incisions:  You will have small incisions. Do not scrub incisions, but allow the warm, soapy water to run over the incisions, rinse well, and pat dry. You may use any brand of anti-bacterial soap. Do not use Peroxide or Neosporin type ointments on sites, unless instructed to do so by a surgeon or nurse. Monitor daily for signs/symptoms of infection, which might include: drainage with a foul odor, pain, redness, swelling or heat at the incision sight; fever, body aches and chills. If you suspect infection or have a fever, give us a call.  Pain:  You will be given a prescription for pain medication to control your pain. If you feel the dose is too strong, you may take half the ordered dose, or you may take Tylenol adult liquid per package  instructions for minor pain. Do not take any medications that contain aspirin or aspirin products.  Do not take medications like: Motrin, Aleve, Ibuprofen, Advil, Naproxen, Celebrex, Daypro, Bextra, Meloxicam or other medications commonly used for arthritis or joint pain.  No steroids or cortisone injections. There may be pain, which should improve every few days. Pain should not suddenly get worse or more intense. Pain that suddenly changes and is constant and severe should be called in to the surgeon's office. Any sudden pain in the lower extremities with associated warmth and redness should be called in to the surgeon's office immediately. Do not rub or massage this area, as it could be a blood clot.  Diet:  Remain on the clear liquid diet (stage 1) per your  which includes 70 grams of protein each day, sugar free, non carbonated and no straws. Day 1 is the day of surgery. If you are tolerating the stage 1 diet, you may then proceed to stage 2 diet, as instructed in the . Do not progress to the stage 2 diet if you are having nausea/vomiting. Refer to the Basic Nutrition and Food Principles guide.  Medications:  The nurse will let you know which medications you will need to continue once you go home. Do not take any medications that are extended or time released if you had the gastric bypass procedure, OK to take if you had the gastric sleeve procedure. Large capsules can be opened and diluted with clear liquids. Check with your physician or pharmacist as to which pills may be crushed and which capsules may be opened and diluted safely. Continue taking Foltx as surgeon orders. If you still have your gall bladder and were prescribed Actigall (Ursodiol), you may start this medication one week after your surgery. You will remain on Actigall (Ursodiol) for approximately 6 months. The dose is 1 pill, 2 times each day for 6 months.  Activity:  Continue your deep breathing and coughing  exercises with your Incentive Spirometer breathing device at least every 2 hours while awake (10 repetitions each time) for one week. May use CPAP. This will help to prevent respiratory problems such as pneumonia. No lifting, pulling or tugging anything over 25 pounds for 3 weeks after surgery. You may shower but no tub baths, hot tubs or swimming for 2 weeks. Moderate walking is recommended every 2 hours and at least 4 times per day minimum, increase distance daily. Further exercise will be discussed at the first post-op visit. No driving or operating machinery allow until off narcotic pain medication and until you feel comfortable forcefully applying the brakes (usually takes 3 or more days). For the next few weeks you are at an increased risk for blood clot formation. Therefore you should walk regularly and you should not sit for prolonged periods of time, more than 45 minutes without getting up and walking for 5-10 minutes. This includes car rides. Including riding home from the hospital. If riding a distance greater than 30 miles over the next 2 weeks stop every 30-45 minutes and walk 5-10 mintues each time. No tanning bed use for 8 weeks after surgery and in general, not recommended due to the increased risk for skin cancer. Incisions will burn/blister very badly with tanning bed use.  Illness:  Your primary care physician should treat general illness such as ear infections, sinus infections, and viral type illnesses, etc. Medications prescribed should be liquid/elixir form when possible, for the first 30 days.  General:  In general, it is recommended that you weigh yourself no more than once per week. Let the weight come off you and concentrate on more important things. Remember the weight was not gained overnight, nor will it be lost overnight. Gastric Bypass/ Gastric Sleeve weight loss will continue over a period of 12-18 months. Do not  yourself according to how others are doing after surgery, as this  will cause unnecessary discouragement.  THE ABOVE ARE GENERAL GUIDELINES TO ASSIST YOU ONCE HOME, IF YOU ARE IN DOUBT, OR YOU HAVE ANY QUESTIONS, CALL US AT THE NUMBERS LISTED BELOW.  IN THE EVENT OF SUDDEN CHEST PAIN, SHORTNESS OF BREATH, OR ANY LIFE THREATENING CONDITION, CALL 911.  Any time you are evaluated or admitted to another facility, please have someone notify the surgeon's office.  Supplements:  70 grams of protein taken EVERY DAY. Remember to drink at least 64 ounces of fluid a day, sipping slowly early on. Increase this amount during the summertime. Sipping slowly will not stretch your new stomach. Drinking too fast or gulping liquids will cause brief discomfort and early could cause staple line disruption (leak). With eating, tiny bites, then chew, chew, chew, and swallow. Lay your fork/spoon down for 2-3 minutes, and then take your next bite. Your pouch will tell you within 1-2 bites if it is going to tolerate what you are eating.   Protein Vendors:  Refer to protein vendors' handout from consult class. You can always find protein drinks at the bariatric office, grocery stores, [employer], drug [employer], [employer], health food stores, and on the Internet. Find one high in protein (15-30 grams per serving) and low carb (less than 18 grams per serving).  Now is a great time to re-read your . Please review specific instructions given to you at discharge by your physician (surgeon).  HOW/WHEN TO CONTACT US:  It is imperative that you contact us with any of the following:    Ÿ fever greater than 101 degrees  Ÿ shortness of breath  Ÿ leg swelling  Ÿ body aches  Ÿ shaking chills  Ÿ nausea and vomitting  Ÿ pain that has worsened  Ÿ redness at incision sites  Ÿ pus or foul smelling drainage from an incision or wound  Ÿ inability to keep fluids down for more than a day  Ÿ any other condition you feel needs our attention.  [facility] Bariatric: [phone] call this number anytime 24 hours  a day / 7 days a week.  Teach-back Questions to be answered by the patient prior to discharge.   What complications would prompt you to call your doctor when you return home? _________________    What is the purpose of your prescribed medication? ________________  What are some potential side effects of the medications you will be taking at home? _______________

## 2018-02-16 NOTE — DISCHARGE SUMMARY
"Discharge Summary    Patient name: Leena Mims    Medical record number: [id number]    Admission date: 2/14/2018  Discharge date:      Attending physician: Dr. Ej Coleman    Primary care physician: Telma Tavera MD    Referring physician: Ej Coleman Jr., MD  [address] [address]    Condition on discharge: Stable    Primary Diagnoses:  Morbid obesity with co-morbidities    Operative Procedure:  Lap gastric sleeve     Leena Mims  is post op day two status post procedure listed. Patient denies shortness of air and lower extremity pain. Feels better than yesterday. No vomiting this am. Ambulating well and using incentive spirometer.          /84 (BP Location: Right arm, Patient Position: Lying)  Pulse 82  Temp 99.6 °F (37.6 °C) (Oral)   Resp 18  Ht 160 cm (63\")  Wt 102 kg (224 lb 4.8 oz)  SpO2 94%  BMI 39.73 kg/m2    General:  alert, appears stated age and cooperative   Abdomen: soft, bowel sounds active, appropriate tenderness   Incision:   healing well, no drainage, no erythema, no hernia, no seroma, no swelling, no dehiscence, incision well approximated   Heart: Regular rate   Lungs: Clear to auscultation bilaterally     I reviewed the patient's new clinical results.     Lab Results (last 24 hours)     Procedure Component Value Units Date/Time    CBC & Differential [[id number]] Collected:  02/16/18 0411    Specimen:  Blood Updated:  02/16/18 0445    Narrative:       The following orders were created for panel order CBC & Differential.  Procedure                               Abnormality         Status                     ---------                               -----------         ------                     CBC Auto Differential[[id number]]        Abnormal            Final result                 Please view results for these tests on the individual orders.    CBC Auto Differential [[id number]]  (Abnormal) Collected:  02/16/18 0411    Specimen:  Blood Updated:  " 02/16/18 0445     WBC 12.32 (H) 10*3/mm3      RBC 4.23 10*6/mm3      Hemoglobin 13.1 g/dL      Hematocrit 41.1 %      MCV 97.2 fL      MCH 31.0 pg      MCHC 31.9 (L) g/dL      RDW 13.1 (H) %      RDW-SD 46.8 fl      MPV 10.8 fL      Platelets 213 10*3/mm3      Neutrophil % 75.8 %      Lymphocyte % 14.6 (L) %      Monocyte % 6.2 %      Eosinophil % 3.0 %      Basophil % 0.2 %      Immature Grans % 0.2 %      Neutrophils, Absolute 9.33 (H) 10*3/mm3      Lymphocytes, Absolute 1.80 10*3/mm3      Monocytes, Absolute 0.76 10*3/mm3      Eosinophils, Absolute 0.37 10*3/mm3      Basophils, Absolute 0.03 10*3/mm3      Immature Grans, Absolute 0.03 10*3/mm3     Basic Metabolic Panel [[id number]]  (Abnormal) Collected:  02/16/18 0411    Specimen:  Blood Updated:  02/16/18 0506     Glucose 119 (H) mg/dL      BUN 4 (L) mg/dL      Creatinine 0.42 (L) mg/dL      Sodium 141 mmol/L      Potassium 3.3 (L) mmol/L      Chloride 104 mmol/L      CO2 20.6 (L) mmol/L      Calcium 9.1 mg/dL      eGFR Non African Amer >150 mL/min/1.73      BUN/Creatinine Ratio 9.5     Anion Gap 16.4 mmol/L     Narrative:       GFR Normal >60  Chronic Kidney Disease <60  Kidney Failure <15             Assessment:      Doing well postoperatively.      Plan:   1. Continue Stage 1 diet  2. Continue with ambulation and Incentive spirometry  3. Plan for d/c home    Patient was seen and examined by Dr. Coleman.    Hospital Course: The patient is a very pleasant 47 y.o. female that was admitted to the hospital with morbid obesity who underwent above procedure.  Postoperatively the patient was transferred to the bariatric unit per protocol.  Patient remained afebrile and hemodynamically stable.  Patient was up ambulating well and using incentive spirometer.  Postoperatively day 1 patient was started on stage I bariatric diet and continued with good ambulation.  Lovenox was continued.  Patient kept overnight for pain management. POD#2 patient feeling much better with  decreased pain and ambulating well and tolerating diet. Patient was then discharged home.        Discharge medications:    Felicita Leena TR   Home Medication Instructions BRITTNEY:[id number]    Printed on:02/16/18 0634   Medication Information                      ALBUTEROL IN  Inhale 1 puff As Needed.             BREO ELLIPTA 100-25 MCG/INH aerosol powder   INL 1 PUFF PO D             cyclobenzaprine (FLEXERIL) 10 MG tablet  Take 10 mg by mouth 2 (Two) Times a Day As Needed.             dicyclomine (BENTYL) 10 MG capsule  Take 10 mg by mouth 4 (four) times a day before meals and nightly.             folic acid-pyridoxine-cyanocobalamin (FOLBIC) 2.5-25-2 MG tablet tablet  Take 1 tablet by mouth Daily. Begin two weeks before surgery             HYDROcodone-acetaminophen (HYCET) 7.5-325 MG/15ML solution  Take 15 mL by mouth Every 4 (Four) Hours As Needed for Moderate Pain  for up to 3 days.             montelukast (SINGULAIR) 10 MG tablet  Take 10 mg by mouth every night.             omeprazole (priLOSEC) 20 MG capsule  TAKE 1 CAPSULE BY MOUTH DAILY             ondansetron (ZOFRAN) 4 MG tablet  Take 1 tablet by mouth Every 8 (Eight) Hours As Needed for Nausea or Vomiting.                 Discharge instructions:  Per Bariatric manual; per our protocol      Follow-up appointment: Follow up with Dr. Coleman in the office as scheduled.  If not already scheduled call for appointment at [phone].

## 2018-02-16 NOTE — PLAN OF CARE
Problem: Patient Care Overview (Adult)  Goal: Plan of Care Review  Outcome: Outcome(s) achieved Date Met: 02/16/18 02/16/18 0936   Coping/Psychosocial Response Interventions   Plan Of Care Reviewed With patient   Patient Care Overview   Progress improving   Outcome Evaluation   Outcome Summary/Follow up Plan VSS, will follow up w surgeon and PCP in 1 week regarding surgery and BP management; see orders for d/c; pain controlled w PO narcotics     Goal: Adult Individualization and Mutuality  Outcome: Outcome(s) achieved Date Met: 02/16/18    Goal: Discharge Needs Assessment  Outcome: Outcome(s) achieved Date Met: 02/16/18      Problem: Perioperative Period (Adult)  Goal: Signs and Symptoms of Listed Potential Problems Will be Absent or Manageable (Perioperative Period)  Outcome: Outcome(s) achieved Date Met: 02/16/18      Problem: Bariatric Surgery (Open/Laparoscopic) (Adult,Pediatric)  Goal: Signs and Symptoms of Listed Potential Problems Will be Absent or Manageable (Bariatric Surgery)  Outcome: Outcome(s) achieved Date Met: 02/16/18      Problem: Respiratory Insufficiency (Adult)  Goal: Effective Ventilation  Outcome: Outcome(s) achieved Date Met: 02/16/18

## 2018-02-19 ENCOUNTER — TELEPHONE (OUTPATIENT)
Dept: BARIATRICS/WEIGHT MGMT | Facility: CLINIC | Age: 48
End: 2018-02-19

## 2018-02-19 NOTE — TELEPHONE ENCOUNTER
Spoke with the patient. She notes that she is still having some abdominal pain from her surgery.     We discussed the use of Tylenol. Also recommended that she try to walk.  She notes that she will start Tylenol for her pain and also notes that she just got a treadmill yesterday and will begin using this to walk.

## 2018-02-21 ENCOUNTER — OFFICE VISIT (OUTPATIENT)
Dept: BARIATRICS/WEIGHT MGMT | Facility: CLINIC | Age: 48
End: 2018-02-21

## 2018-02-21 VITALS
HEIGHT: 63 IN | TEMPERATURE: 98.6 F | BODY MASS INDEX: 38.27 KG/M2 | HEART RATE: 96 BPM | RESPIRATION RATE: 16 BRPM | DIASTOLIC BLOOD PRESSURE: 75 MMHG | SYSTOLIC BLOOD PRESSURE: 133 MMHG | WEIGHT: 216 LBS

## 2018-02-21 DIAGNOSIS — K21.9 GASTROESOPHAGEAL REFLUX DISEASE WITHOUT ESOPHAGITIS: ICD-10-CM

## 2018-02-21 DIAGNOSIS — G89.18 POST-OP PAIN: ICD-10-CM

## 2018-02-21 DIAGNOSIS — M25.50 MULTIPLE JOINT PAIN: ICD-10-CM

## 2018-02-21 DIAGNOSIS — R53.82 CHRONIC FATIGUE: ICD-10-CM

## 2018-02-21 DIAGNOSIS — E66.9 OBESITY (BMI 30-39.9): Primary | ICD-10-CM

## 2018-02-21 DIAGNOSIS — Z71.3 DIETARY COUNSELING: ICD-10-CM

## 2018-02-21 DIAGNOSIS — G47.33 OSA (OBSTRUCTIVE SLEEP APNEA): ICD-10-CM

## 2018-02-21 PROCEDURE — 99024 POSTOP FOLLOW-UP VISIT: CPT | Performed by: NURSE PRACTITIONER

## 2018-02-21 NOTE — PROGRESS NOTES
[facility] SURG[facility]  [address]  [place] [id number] [phone]  [address]  [place] [address] [phone]  Dept: [phone]  2/21/2018      Leena Mims.  [id number]  [id number]  1970  female      Chief Complaint   Patient presents with   • Follow-up     1 week postop GS        Post-Op Bariatric Surgery:   Leena Mims is status post laparopscopic Laparoscopic Sleeve procedure, performed on 2/14/18.     HPI:   Today's weight is 98 kg (216 lb) pounds, today's BMI is Body mass index is 38.27 kg/(m^2)., she has a  loss of 17 pounds since the last visit and her weight loss since surgery is 17 pounds. The patient reports a decreased portion size and loss of appetite.  Leena Mims denies nausea, vomiting, dysphagia, or heartburn. The patient c/o post-op pain that is improving. she is doing well with protein and water intake so far. Taking their vitamins, walking and using IS. Denies fevers, chills, chest pain or shortness of air.      Diet and Exercise: Diet history reviewed and discussed with the patient. Weight loss/gains to date discussed with the patient. No carbonated beverage consumption and exercising regularly- walking frequently.   Supplements: multivitamins, B-12, calcium, iron, B-1 and Vitamin D.     Review of Systems   Gastrointestinal: Positive for abdominal pain (post op).   All other systems reviewed and are negative.      Patient Active Problem List   Diagnosis   • SULEMA (obstructive sleep apnea)   • GERD (gastroesophageal reflux disease)   • Chronic fatigue   • Edema   • Multiple joint pain   • Depression with anxiety   • Asthma   • Dietary counseling   • IBS (irritable bowel syndrome)   • Simple chronic bronchitis   • Obesity (BMI 30-39.9)   • Post-op pain       The following portions of the patient's history were reviewed and updated as appropriate: allergies, current medications, past family  history, past medical history, past social history, past surgical history and problem list.    Vitals:    02/21/18 1054   BP: 133/75   Pulse: 96   Resp: 16   Temp: 98.6 °F (37 °C)       Physical Exam   Constitutional: She is oriented to person, place, and time. She appears well-developed and well-nourished.   HENT:   Head: Normocephalic and atraumatic.   Eyes: EOM are normal.   Cardiovascular: Normal rate, regular rhythm and normal heart sounds.    Pulmonary/Chest: Effort normal and breath sounds normal.   Abdominal: Soft. Bowel sounds are normal. She exhibits no distension. There is tenderness (post op).   Musculoskeletal: Normal range of motion.   Neurological: She is alert and oriented to person, place, and time.   Skin: Skin is warm and dry.   Incisions healing well   Psychiatric: She has a normal mood and affect. Her behavior is normal. Judgment and thought content normal.   Vitals reviewed.      Assessment:   Post-op, the patient is doing well.     Encounter Diagnoses   Name Primary?   • Obesity (BMI 30-39.9) Yes   • Dietary counseling    • Post-op pain    • SULEMA (obstructive sleep apnea)    • Gastroesophageal reflux disease without esophagitis    • Multiple joint pain    • Chronic fatigue        Plan:   Reviewed with patient the importance of following the manual for diet progression. Increase activity as tolerated. Continue increasing daily intake of protein and water.   Return to work: the patient is to return to 3 weeks from their surgery date with no restrictions unless they develop medical problems in which we will see them back in the office. They received a note in our office today with their return to work date.  Activity restrictions: no lifting, pushing or pulling over 25lbs for 3 weeks.   Recommended patient be sure to get at least 70 grams of protein per day. Discussed with the patient the recommended amount of water per day to intake. Reviewed vitamin requirements. Be sure to do routine exercise  and increase activity as tolerated. No asa, nsaids or steroids for 8 weeks. Patient may use miralax as needed if necessary.     Instructions / Recommendations: dietary counseling recommended, recommended a daily protein intake of  grams, vitamin supplement(s) recommended, recommended exercising at least 150 minutes per week, behavior modifications recommended and instructed to call the office for concerns, questions, or problems.     The patient was instructed to follow up at one month follow up appt.     The patient was counseled regarding post op bariatric manual

## 2018-02-26 RX ORDER — OMEPRAZOLE 20 MG/1
CAPSULE, DELAYED RELEASE ORAL
Qty: 30 CAPSULE | Refills: 0 | Status: SHIPPED | OUTPATIENT
Start: 2018-02-26 | End: 2018-03-24 | Stop reason: SDUPTHER

## 2018-03-01 ENCOUNTER — DOCUMENTATION (OUTPATIENT)
Dept: BARIATRICS/WEIGHT MGMT | Facility: CLINIC | Age: 48
End: 2018-03-01

## 2018-03-21 ENCOUNTER — LAB (OUTPATIENT)
Dept: LAB | Facility: HOSPITAL | Age: 48
End: 2018-03-21

## 2018-03-21 ENCOUNTER — OFFICE VISIT (OUTPATIENT)
Dept: BARIATRICS/WEIGHT MGMT | Facility: CLINIC | Age: 48
End: 2018-03-21

## 2018-03-21 VITALS
WEIGHT: 211 LBS | TEMPERATURE: 98.5 F | DIASTOLIC BLOOD PRESSURE: 82 MMHG | RESPIRATION RATE: 16 BRPM | HEART RATE: 92 BPM | SYSTOLIC BLOOD PRESSURE: 130 MMHG | BODY MASS INDEX: 35.16 KG/M2 | HEIGHT: 65 IN

## 2018-03-21 DIAGNOSIS — E66.9 OBESITY (BMI 30-39.9): Primary | ICD-10-CM

## 2018-03-21 DIAGNOSIS — Z71.3 DIETARY COUNSELING: ICD-10-CM

## 2018-03-21 DIAGNOSIS — R53.82 CHRONIC FATIGUE: ICD-10-CM

## 2018-03-21 DIAGNOSIS — R60.9 EDEMA, UNSPECIFIED TYPE: ICD-10-CM

## 2018-03-21 DIAGNOSIS — M25.50 MULTIPLE JOINT PAIN: ICD-10-CM

## 2018-03-21 DIAGNOSIS — G47.33 OSA (OBSTRUCTIVE SLEEP APNEA): ICD-10-CM

## 2018-03-21 DIAGNOSIS — E66.9 OBESITY (BMI 30-39.9): ICD-10-CM

## 2018-03-21 DIAGNOSIS — R42 LIGHT HEADED: ICD-10-CM

## 2018-03-21 DIAGNOSIS — K21.9 GASTROESOPHAGEAL REFLUX DISEASE WITHOUT ESOPHAGITIS: ICD-10-CM

## 2018-03-21 PROBLEM — G89.18 POST-OP PAIN: Status: RESOLVED | Noted: 2018-02-21 | Resolved: 2018-03-21

## 2018-03-21 LAB
25(OH)D3 SERPL-MCNC: 14 NG/ML (ref 30–100)
ALBUMIN SERPL-MCNC: 3.8 G/DL (ref 3.5–5.2)
ALBUMIN/GLOB SERPL: 1.2 G/DL
ALP SERPL-CCNC: 89 U/L (ref 39–117)
ALT SERPL W P-5'-P-CCNC: 17 U/L (ref 1–33)
ANION GAP SERPL CALCULATED.3IONS-SCNC: 11.5 MMOL/L
AST SERPL-CCNC: 13 U/L (ref 1–32)
BASOPHILS # BLD AUTO: 0.03 10*3/MM3 (ref 0–0.2)
BASOPHILS NFR BLD AUTO: 0.3 % (ref 0–1.5)
BILIRUB SERPL-MCNC: 0.2 MG/DL (ref 0.1–1.2)
BUN BLD-MCNC: 11 MG/DL (ref 6–20)
BUN/CREAT SERPL: 18.6 (ref 7–25)
CALCIUM SPEC-SCNC: 9.5 MG/DL (ref 8.6–10.5)
CHLORIDE SERPL-SCNC: 104 MMOL/L (ref 98–107)
CO2 SERPL-SCNC: 23.5 MMOL/L (ref 22–29)
CREAT BLD-MCNC: 0.59 MG/DL (ref 0.57–1)
DEPRECATED RDW RBC AUTO: 47.6 FL (ref 37–54)
EOSINOPHIL # BLD AUTO: 0.29 10*3/MM3 (ref 0–0.7)
EOSINOPHIL NFR BLD AUTO: 2.8 % (ref 0.3–6.2)
ERYTHROCYTE [DISTWIDTH] IN BLOOD BY AUTOMATED COUNT: 13.3 % (ref 11.7–13)
FERRITIN SERPL-MCNC: 208.3 NG/ML (ref 13–150)
FOLATE SERPL-MCNC: 14.48 NG/ML (ref 4.78–24.2)
GFR SERPL CREATININE-BSD FRML MDRD: 110 ML/MIN/1.73
GLOBULIN UR ELPH-MCNC: 3.2 GM/DL
GLUCOSE BLD-MCNC: 87 MG/DL (ref 65–99)
HCT VFR BLD AUTO: 45.8 % (ref 35.6–45.5)
HGB BLD-MCNC: 14.9 G/DL (ref 11.9–15.5)
IMM GRANULOCYTES # BLD: 0 10*3/MM3 (ref 0–0.03)
IMM GRANULOCYTES NFR BLD: 0 % (ref 0–0.5)
IRON 24H UR-MRATE: 53 MCG/DL (ref 37–145)
LYMPHOCYTES # BLD AUTO: 3.09 10*3/MM3 (ref 0.9–4.8)
LYMPHOCYTES NFR BLD AUTO: 29.9 % (ref 19.6–45.3)
MAGNESIUM SERPL-MCNC: 2.2 MG/DL (ref 1.6–2.6)
MCH RBC QN AUTO: 31.6 PG (ref 26.9–32)
MCHC RBC AUTO-ENTMCNC: 32.5 G/DL (ref 32.4–36.3)
MCV RBC AUTO: 97 FL (ref 80.5–98.2)
MONOCYTES # BLD AUTO: 0.44 10*3/MM3 (ref 0.2–1.2)
MONOCYTES NFR BLD AUTO: 4.3 % (ref 5–12)
NEUTROPHILS # BLD AUTO: 6.47 10*3/MM3 (ref 1.9–8.1)
NEUTROPHILS NFR BLD AUTO: 62.7 % (ref 42.7–76)
PHOSPHATE SERPL-MCNC: 3.1 MG/DL (ref 2.5–4.5)
PLATELET # BLD AUTO: 258 10*3/MM3 (ref 140–500)
PMV BLD AUTO: 10.7 FL (ref 6–12)
POTASSIUM BLD-SCNC: 4.1 MMOL/L (ref 3.5–5.2)
PREALB SERPL-MCNC: 18.8 MG/DL (ref 20–40)
PROT SERPL-MCNC: 7 G/DL (ref 6–8.5)
RBC # BLD AUTO: 4.72 10*6/MM3 (ref 3.9–5.2)
SODIUM BLD-SCNC: 139 MMOL/L (ref 136–145)
WBC NRBC COR # BLD: 10.32 10*3/MM3 (ref 4.5–10.7)

## 2018-03-21 PROCEDURE — 84134 ASSAY OF PREALBUMIN: CPT

## 2018-03-21 PROCEDURE — 84425 ASSAY OF VITAMIN B-1: CPT

## 2018-03-21 PROCEDURE — 36415 COLL VENOUS BLD VENIPUNCTURE: CPT

## 2018-03-21 PROCEDURE — 82306 VITAMIN D 25 HYDROXY: CPT

## 2018-03-21 PROCEDURE — 84630 ASSAY OF ZINC: CPT

## 2018-03-21 PROCEDURE — 80053 COMPREHEN METABOLIC PANEL: CPT

## 2018-03-21 PROCEDURE — 83735 ASSAY OF MAGNESIUM: CPT

## 2018-03-21 PROCEDURE — 84597 ASSAY OF VITAMIN K: CPT

## 2018-03-21 PROCEDURE — 84100 ASSAY OF PHOSPHORUS: CPT

## 2018-03-21 PROCEDURE — 82746 ASSAY OF FOLIC ACID SERUM: CPT

## 2018-03-21 PROCEDURE — 99024 POSTOP FOLLOW-UP VISIT: CPT | Performed by: NURSE PRACTITIONER

## 2018-03-21 PROCEDURE — 83540 ASSAY OF IRON: CPT

## 2018-03-21 PROCEDURE — 84446 ASSAY OF VITAMIN E: CPT

## 2018-03-21 PROCEDURE — 82728 ASSAY OF FERRITIN: CPT

## 2018-03-21 PROCEDURE — 85025 COMPLETE CBC W/AUTO DIFF WBC: CPT

## 2018-03-21 PROCEDURE — 83921 ORGANIC ACID SINGLE QUANT: CPT

## 2018-03-21 PROCEDURE — 84590 ASSAY OF VITAMIN A: CPT

## 2018-03-21 NOTE — PROGRESS NOTES
[facility]  [address]  [facility] [id number] [phone]  [address]  [facility] [id number] [phone]  Dept: [phone]  3/21/2018      Leena Mims.  [id number]  [id number]  1970  female      Chief Complaint   Patient presents with   • Follow-up     1 month postop GS        Post-Op Bariatric Surgery:   Leena Mims is status post Laparoscopic Sleeve/HH procedure, performed on 2/14/18     HPI:   Today's weight is 95.7 kg (211 lb) pounds, today's BMI is Body mass index is 35.11 kg/m²., she has a  loss of 5 pounds since the last visit and her weight loss since surgery is 22 pounds. The patient reports a decreased portion size and loss of appetite.      Leena Mims denies nausea, vomiting, dysphagia, abdominal pain or heartburn. Tolerating diet advancement without any problems other than frothing but most likely she is eating too fast so will work on slowing down.     Diet and Exercise: Diet history reviewed and discussed with the patient. Weight loss/gains to date discussed with the patient. The patient states they are eating 60-80 grams of protein per day. She reports eating 3 meals per day, a typical portion size of 1/2 cup, eating 1 snacks per day, drinking 5 or more 8-oz. glasses of water per day, no carbonated beverage consumption and exercising regularly- walking 5 times per week.     Diet recall- see scanned in paper    Supplements: Bienvenido.     Review of Systems   Constitutional: Positive for fatigue.   Neurological: Positive for light-headedness.   All other systems reviewed and are negative.      Patient Active Problem List   Diagnosis   • SULEMA (obstructive sleep apnea)   • GERD (gastroesophageal reflux disease)   • Chronic fatigue   • Edema   • Multiple joint pain   • Depression with anxiety   • Asthma   • Dietary counseling   • IBS (irritable bowel syndrome)   • Simple chronic bronchitis   •  Obesity (BMI 30-39.9)   • Light headed       Past Medical History:   Diagnosis Date   • Arthritis    • Asthma    • COPD (chronic obstructive pulmonary disease)    • Dizziness    • Dysmenorrhea    • Edema    • Fatigue    • Fibromyalgia    • GERD (gastroesophageal reflux disease)    • Heartburn    • IBS (irritable bowel syndrome)    • Kidney stone    • Multiple joint pain    • SULEMA (obstructive sleep apnea)     NO MACHINE   • Plantar fasciitis    • RLS (restless legs syndrome)    • Sciatica    • Urge and stress incontinence        The following portions of the patient's history were reviewed and updated as appropriate: allergies, current medications, past family history, past medical history, past social history, past surgical history and problem list.    Vitals:    03/21/18 1456   BP: 130/82   Pulse: 92   Resp: 16   Temp: 98.5 °F (36.9 °C)       Physical Exam   Constitutional: She is oriented to person, place, and time. She appears well-developed and well-nourished.   HENT:   Head: Normocephalic and atraumatic.   Eyes: EOM are normal.   Cardiovascular: Normal rate, regular rhythm and normal heart sounds.    Pulmonary/Chest: Effort normal and breath sounds normal.   Abdominal: Soft. Bowel sounds are normal. She exhibits no distension. There is no tenderness.   Musculoskeletal: Normal range of motion.   Neurological: She is alert and oriented to person, place, and time.   Skin: Skin is warm and dry.   Psychiatric: She has a normal mood and affect. Her behavior is normal. Judgment and thought content normal.   Vitals reviewed.        Assessment:   Post-op, the patient is doing well.     Encounter Diagnoses   Name Primary?   • Obesity (BMI 30-39.9) Yes   • Dietary counseling    • SULEMA (obstructive sleep apnea)    • Gastroesophageal reflux disease without esophagitis    • Multiple joint pain    • Edema, unspecified type    • Chronic fatigue    • Light headed        Plan:     Encouraged patient to be sure to get plenty of  lean protein per day through small frequent meals all with a protein source. Be sure drinking enough water throughout the day (will give a note so she can drink at work) and make sure eating often enough and enough protein to prevent light headedness. Reviewed goal weight/time frame. Continue to advance diet per the manual. Increase exercise as tolerated. Slow way down and be sure to chew well while eating.  Activity restrictions: none.   Recommended patient be sure to get at least 70 grams of protein per day by eating small, frequent meals all with high lean protein choices. Be sure to limit/cut back on daily carbohydrate intake. Discussed with the patient the recommended amount of water per day to intake- half of body weight in ounces. Reviewed vitamin requirements. Be sure to do routine exercise, 150 minutes per week minimum, including both cardio and strength training.     Instructions / Recommendations: dietary counseling recommended, recommended a daily protein intake of  grams, vitamin supplement(s) recommended, recommended exercising at least 150 minutes per week, behavior modifications recommended and instructed to call the office for concerns, questions, or problems.     The patient was instructed to follow up in 2 months     The patient was counseled regarding. Total time spent face to face was 15 minutes and 8 minutes was spent counseling.

## 2018-03-24 LAB — ZINC SERPL-MCNC: 75 UG/DL (ref 56–134)

## 2018-03-26 LAB
PHYTONADIONE SERPL-MCNC: 0.37 NG/ML (ref 0.13–1.88)
VIT B1 BLD-SCNC: 133.8 NMOL/L (ref 66.5–200)

## 2018-03-26 RX ORDER — OMEPRAZOLE 20 MG/1
CAPSULE, DELAYED RELEASE ORAL
Qty: 30 CAPSULE | Refills: 0 | Status: SHIPPED | OUTPATIENT
Start: 2018-03-26 | End: 2018-04-24 | Stop reason: SDUPTHER

## 2018-03-27 LAB — METHYLMALONATE SERPL-SCNC: 98 NMOL/L (ref 0–378)

## 2018-03-28 LAB
A-TOCOPHEROL VIT E SERPL-MCNC: 8.8 MG/L (ref 5.3–16.8)
VIT A SERPL-MCNC: 32 UG/DL (ref 20–65)

## 2018-04-24 RX ORDER — OMEPRAZOLE 20 MG/1
CAPSULE, DELAYED RELEASE ORAL
Qty: 30 CAPSULE | Refills: 0 | Status: SHIPPED | OUTPATIENT
Start: 2018-04-24 | End: 2018-05-18 | Stop reason: SDUPTHER

## 2018-05-18 RX ORDER — OMEPRAZOLE 20 MG/1
CAPSULE, DELAYED RELEASE ORAL
Qty: 30 CAPSULE | Refills: 0 | Status: SHIPPED | OUTPATIENT
Start: 2018-05-18 | End: 2018-06-22 | Stop reason: SDUPTHER

## 2018-05-22 ENCOUNTER — TELEPHONE (OUTPATIENT)
Dept: BARIATRICS/WEIGHT MGMT | Facility: CLINIC | Age: 48
End: 2018-05-22

## 2018-05-22 RX ORDER — ERGOCALCIFEROL 1.25 MG/1
50000 CAPSULE ORAL
Qty: 12 CAPSULE | Refills: 0 | Status: SHIPPED | OUTPATIENT
Start: 2018-05-22 | End: 2018-08-08

## 2018-05-22 NOTE — TELEPHONE ENCOUNTER
----- Message from ROXY Campbell sent at 5/22/2018  2:56 PM EDT -----  Gave patient rx for vitamin d. Needs to increase her protein level

## 2018-05-22 NOTE — TELEPHONE ENCOUNTER
Tried multiple times to call this patient.  The number was either busy or would ring once and then hang up.  Letter mailed.

## 2018-06-22 RX ORDER — OMEPRAZOLE 20 MG/1
CAPSULE, DELAYED RELEASE ORAL
Qty: 30 CAPSULE | Refills: 0 | Status: SHIPPED | OUTPATIENT
Start: 2018-06-22 | End: 2022-05-02

## 2019-09-18 ENCOUNTER — TRANSCRIBE ORDERS (OUTPATIENT)
Dept: ADMINISTRATIVE | Facility: HOSPITAL | Age: 49
End: 2019-09-18

## 2019-09-18 DIAGNOSIS — Z12.31 VISIT FOR SCREENING MAMMOGRAM: Primary | ICD-10-CM

## 2021-08-04 ENCOUNTER — TELEPHONE (OUTPATIENT)
Dept: BARIATRICS/WEIGHT MGMT | Facility: CLINIC | Age: 51
End: 2021-08-04

## 2021-08-04 NOTE — TELEPHONE ENCOUNTER
"Attempted to call patient.  Both numbers are \"wrong\" numbers. Will send letter for annual. Padmini  "

## 2022-04-21 RX ORDER — BUPROPION HYDROCHLORIDE 150 MG/1
TABLET ORAL
Qty: 30 TABLET | Refills: 2 | Status: SHIPPED | OUTPATIENT
Start: 2022-04-21 | End: 2022-05-04 | Stop reason: SDUPTHER

## 2022-05-02 ENCOUNTER — OFFICE VISIT (OUTPATIENT)
Dept: FAMILY MEDICINE CLINIC | Facility: CLINIC | Age: 52
End: 2022-05-02

## 2022-05-02 VITALS
BODY MASS INDEX: 35.49 KG/M2 | DIASTOLIC BLOOD PRESSURE: 78 MMHG | SYSTOLIC BLOOD PRESSURE: 126 MMHG | HEART RATE: 100 BPM | HEIGHT: 65 IN | WEIGHT: 213 LBS | OXYGEN SATURATION: 97 %

## 2022-05-02 DIAGNOSIS — J45.20 MILD INTERMITTENT ASTHMA WITHOUT COMPLICATION: ICD-10-CM

## 2022-05-02 DIAGNOSIS — E56.9 VITAMIN DEFICIENCY: ICD-10-CM

## 2022-05-02 DIAGNOSIS — G47.33 OSA (OBSTRUCTIVE SLEEP APNEA): ICD-10-CM

## 2022-05-02 DIAGNOSIS — Z12.11 SCREENING FOR COLON CANCER: ICD-10-CM

## 2022-05-02 DIAGNOSIS — K21.9 GASTROESOPHAGEAL REFLUX DISEASE WITHOUT ESOPHAGITIS: ICD-10-CM

## 2022-05-02 DIAGNOSIS — R73.9 HYPERGLYCEMIA: ICD-10-CM

## 2022-05-02 DIAGNOSIS — R53.83 FATIGUE, UNSPECIFIED TYPE: ICD-10-CM

## 2022-05-02 DIAGNOSIS — F41.8 DEPRESSION WITH ANXIETY: Primary | ICD-10-CM

## 2022-05-02 DIAGNOSIS — E78.2 MIXED HYPERLIPIDEMIA: ICD-10-CM

## 2022-05-02 PROCEDURE — 99214 OFFICE O/P EST MOD 30 MIN: CPT | Performed by: NURSE PRACTITIONER

## 2022-05-02 RX ORDER — HYDROCODONE BITARTRATE AND ACETAMINOPHEN 7.5; 325 MG/1; MG/1
TABLET ORAL
COMMUNITY
Start: 2022-04-25

## 2022-05-02 RX ORDER — BUSPIRONE HYDROCHLORIDE 10 MG/1
10 TABLET ORAL 2 TIMES DAILY
Qty: 60 TABLET | Refills: 5 | Status: SHIPPED | OUTPATIENT
Start: 2022-05-02 | End: 2022-05-04 | Stop reason: SDUPTHER

## 2022-05-02 RX ORDER — ALBUTEROL SULFATE 90 UG/1
2 AEROSOL, METERED RESPIRATORY (INHALATION) EVERY 4 HOURS PRN
Qty: 18 G | Refills: 1 | Status: SHIPPED | OUTPATIENT
Start: 2022-05-02 | End: 2022-05-04 | Stop reason: SDUPTHER

## 2022-05-02 RX ORDER — OMEPRAZOLE 40 MG/1
40 CAPSULE, DELAYED RELEASE ORAL DAILY
Qty: 90 CAPSULE | Refills: 1 | Status: SHIPPED | OUTPATIENT
Start: 2022-05-02 | End: 2022-05-04 | Stop reason: SDUPTHER

## 2022-05-02 RX ORDER — TRAZODONE HYDROCHLORIDE 50 MG/1
50 TABLET ORAL
COMMUNITY
Start: 2022-01-24 | End: 2022-05-02 | Stop reason: SDUPTHER

## 2022-05-02 RX ORDER — TRAZODONE HYDROCHLORIDE 50 MG/1
100 TABLET ORAL
Qty: 180 TABLET | Refills: 1 | Status: SHIPPED | OUTPATIENT
Start: 2022-05-02 | End: 2022-05-04 | Stop reason: SDUPTHER

## 2022-05-02 NOTE — PROGRESS NOTES
"Chief Complaint  Med Refill and Anxiety    Subjective          Leena Mims presents to [facility]  Patient is here to follow-up on her medications.  She has had increased anxiety and would like to take a medication for that.  She denies feelings of depression.  She has had reflux symptoms but not sure on the dose of her omeprazole.  She has not had a mammogram or colon cancer screening recently.  She uses albuterol as needed for asthma she states is controlled.  She uses trazodone for insomnia.      Objective   Vital Signs:   /78 (BP Location: Left arm, Patient Position: Sitting, Cuff Size: Large Adult)   Pulse 100   Ht 165.1 cm (65\")   Wt 96.6 kg (213 lb)   SpO2 97%   BMI 35.45 kg/m²     Body mass index is 35.45 kg/m².    Review of Systems   Constitutional: Negative for fatigue and fever.   Respiratory: Negative for shortness of breath.    Cardiovascular: Negative for chest pain, palpitations and leg swelling.   Neurological: Negative for syncope.   Psychiatric/Behavioral: The patient is nervous/anxious.         Negative depression          Current Outpatient Medications:   •  buPROPion XL (WELLBUTRIN XL) 150 MG 24 hr tablet, TAKE 1 TABLET BY MOUTH EVERY DAY, Disp: 30 tablet, Rfl: 2  •  HYDROcodone-acetaminophen (NORCO) 7.5-325 MG per tablet, TAKE 1 TABLET 2 TO 3 TIMES A DAY **MUST LAST 30 DAYS, APRIL RX**, Disp: , Rfl:   •  traZODone (DESYREL) 50 MG tablet, Take 2 tablets by mouth every night at bedtime., Disp: 180 tablet, Rfl: 1  •  albuterol sulfate  (90 Base) MCG/ACT inhaler, Inhale 2 puffs Every 4 (Four) Hours As Needed for Wheezing., Disp: 18 g, Rfl: 1  •  busPIRone (BUSPAR) 10 MG tablet, Take 1 tablet by mouth 2 (Two) Times a Day., Disp: 60 tablet, Rfl: 5  •  omeprazole (priLOSEC) 40 MG capsule, Take 1 capsule by mouth Daily., Disp: 90 capsule, Rfl: 1      Allergies: Aspirin, Celebrex [celecoxib], Contrast dye, and Penicillins    Physical " Exam  Constitutional:       Appearance: Normal appearance.   HENT:      Head: Normocephalic.   Eyes:      Conjunctiva/sclera: Conjunctivae normal.      Pupils: Pupils are equal, round, and reactive to light.   Cardiovascular:      Rate and Rhythm: Normal rate and regular rhythm.      Heart sounds: Normal heart sounds.   Pulmonary:      Effort: Pulmonary effort is normal.      Breath sounds: Normal breath sounds.   Abdominal:      Tenderness: There is no abdominal tenderness.   Musculoskeletal:         General: Normal range of motion.   Skin:     General: Skin is warm and dry.      Capillary Refill: Capillary refill takes less than 2 seconds.   Neurological:      General: No focal deficit present.      Mental Status: She is alert and oriented to person, place, and time.   Psychiatric:         Mood and Affect: Mood normal.         Behavior: Behavior normal.         Thought Content: Thought content normal.         Judgment: Judgment normal.          Result Review :                   Assessment and Plan    Diagnoses and all orders for this visit:    1. Depression with anxiety (Primary)  Comments:  Continue Wellbutrin and add BuSpar.  Follow-up in 1 month if not improved.  Return sooner for worsening symptoms  Orders:  -     busPIRone (BUSPAR) 10 MG tablet; Take 1 tablet by mouth 2 (Two) Times a Day.  Dispense: 60 tablet; Refill: 5    2. Screening for colon cancer  Comments:  Cologuard ordered  Orders:  -     Cologuard - Stool, Per Rectum; Future    3. SULEMA (obstructive sleep apnea)  Comments:  We will send an order for a new CPAP machine.  She may need an new sleep study.    4. Mild intermittent asthma without complication  Comments:  Continue current medications  Orders:  -     albuterol sulfate  (90 Base) MCG/ACT inhaler; Inhale 2 puffs Every 4 (Four) Hours As Needed for Wheezing.  Dispense: 18 g; Refill: 1    5. Gastroesophageal reflux disease without esophagitis  Comments:  40 mg of Prilosec as previously  prescribed.  Call if not improving in 1 month.  Orders:  -     omeprazole (priLOSEC) 40 MG capsule; Take 1 capsule by mouth Daily.  Dispense: 90 capsule; Refill: 1    6. Mixed hyperlipidemia  -     Lipid Panel; Future    7. Vitamin deficiency  -     Vitamin B12; Future  -     Vitamin D 25 Hydroxy; Future  -     Folate; Future    8. Fatigue, unspecified type  -     CBC & Differential; Future  -     Comprehensive Metabolic Panel; Future  -     TSH Rfx On Abnormal To Free T4; Future    9. Hyperglycemia  -     Hemoglobin A1c; Future    Other orders  -     traZODone (DESYREL) 50 MG tablet; Take 2 tablets by mouth every night at bedtime.  Dispense: 180 tablet; Refill: 1                     Follow Up   Return in about 1 month (around 6/2/2022) for if not improving or sooner if symptoms worsen.  Patient was given instructions and counseling regarding her condition or for health maintenance advice. Please see specific information pulled into the AVS if appropriate.     ROXY Nava

## 2022-05-03 ENCOUNTER — TELEPHONE (OUTPATIENT)
Dept: FAMILY MEDICINE CLINIC | Facility: CLINIC | Age: 52
End: 2022-05-03

## 2022-05-03 NOTE — TELEPHONE ENCOUNTER
Pt forgot to bring this up to you yesterday at her appt but she wants to know what to do about her not having any sex drive. Does she need to make appt with you or with her obgyn?

## 2022-05-04 DIAGNOSIS — K21.9 GASTROESOPHAGEAL REFLUX DISEASE WITHOUT ESOPHAGITIS: ICD-10-CM

## 2022-05-04 DIAGNOSIS — F41.8 DEPRESSION WITH ANXIETY: ICD-10-CM

## 2022-05-04 DIAGNOSIS — J45.20 MILD INTERMITTENT ASTHMA WITHOUT COMPLICATION: ICD-10-CM

## 2022-05-04 RX ORDER — OMEPRAZOLE 40 MG/1
40 CAPSULE, DELAYED RELEASE ORAL DAILY
Qty: 90 CAPSULE | Refills: 1 | Status: SHIPPED | OUTPATIENT
Start: 2022-05-04 | End: 2022-10-24

## 2022-05-04 RX ORDER — ALBUTEROL SULFATE 90 UG/1
2 AEROSOL, METERED RESPIRATORY (INHALATION) EVERY 4 HOURS PRN
Qty: 18 G | Refills: 1 | Status: SHIPPED | OUTPATIENT
Start: 2022-05-04 | End: 2022-09-12

## 2022-05-04 RX ORDER — BUSPIRONE HYDROCHLORIDE 10 MG/1
10 TABLET ORAL 2 TIMES DAILY
Qty: 60 TABLET | Refills: 5 | Status: SHIPPED | OUTPATIENT
Start: 2022-05-04 | End: 2022-08-24

## 2022-05-04 RX ORDER — BUPROPION HYDROCHLORIDE 150 MG/1
150 TABLET ORAL DAILY
Qty: 30 TABLET | Refills: 2 | Status: SHIPPED | OUTPATIENT
Start: 2022-05-04 | End: 2022-08-15

## 2022-05-04 RX ORDER — TRAZODONE HYDROCHLORIDE 50 MG/1
100 TABLET ORAL
Qty: 180 TABLET | Refills: 1 | Status: SHIPPED | OUTPATIENT
Start: 2022-05-04 | End: 2022-08-24

## 2022-05-04 NOTE — TELEPHONE ENCOUNTER
Resent, I'm not sure how to tell if this request came from the outside pharm request but it may have and he may have to call the pharm and put a stop to his automated refill requests.

## 2022-05-04 NOTE — TELEPHONE ENCOUNTER
PT CALLED TO REPORT MEDICATIONS WERE SENT TO WRONG PHARMACY, PLEASE CANCEL AND RESEND TO [facility].

## 2022-05-16 ENCOUNTER — TELEPHONE (OUTPATIENT)
Dept: FAMILY MEDICINE CLINIC | Facility: CLINIC | Age: 52
End: 2022-05-16

## 2022-05-16 DIAGNOSIS — G47.33 OSA (OBSTRUCTIVE SLEEP APNEA): Primary | ICD-10-CM

## 2022-05-16 NOTE — TELEPHONE ENCOUNTER
PT CALLED AND SAID THE INSURANCE WILL NOT PAY FOR HER ALBUTEROL SULFATE  (90 BASE) MCG/ACT INHALER PLEASE CALL PT BACK PT MAY ALSO NEED A PA COMPLETED.

## 2022-05-16 NOTE — TELEPHONE ENCOUNTER
I printed a new order.  We can fax to wherever she did like.  Where did she have her sleep study done?  We may have to repeat that for insurance to cover a new machine.  They will let her know.  Thanks!

## 2022-05-18 NOTE — TELEPHONE ENCOUNTER
Pt contacted. Wanted faxed to [facility] in [place]. faxing

## 2022-05-19 NOTE — TELEPHONE ENCOUNTER
Can we call the pharmacy and ask about the denial on the Albuterol inhaler? I've never heard of a generic inhaler being denied?? Her insurance may prefer a different brand. Thanks!

## 2022-05-20 RX ORDER — ALBUTEROL SULFATE 90 UG/1
2 AEROSOL, METERED RESPIRATORY (INHALATION) EVERY 4 HOURS PRN
Qty: 18 G | Refills: 1 | Status: SHIPPED | OUTPATIENT
Start: 2022-05-20 | End: 2022-09-12

## 2022-07-20 ENCOUNTER — TELEPHONE (OUTPATIENT)
Dept: FAMILY MEDICINE CLINIC | Facility: CLINIC | Age: 52
End: 2022-07-20

## 2022-07-21 DIAGNOSIS — G47.33 OSA (OBSTRUCTIVE SLEEP APNEA): Primary | ICD-10-CM

## 2022-08-15 RX ORDER — BUPROPION HYDROCHLORIDE 150 MG/1
TABLET ORAL
Qty: 30 TABLET | Refills: 2 | Status: SHIPPED | OUTPATIENT
Start: 2022-08-15 | End: 2022-08-24

## 2022-08-24 ENCOUNTER — OFFICE VISIT (OUTPATIENT)
Dept: FAMILY MEDICINE CLINIC | Facility: CLINIC | Age: 52
End: 2022-08-24

## 2022-08-24 ENCOUNTER — TELEPHONE (OUTPATIENT)
Dept: FAMILY MEDICINE CLINIC | Facility: CLINIC | Age: 52
End: 2022-08-24

## 2022-08-24 VITALS
SYSTOLIC BLOOD PRESSURE: 118 MMHG | OXYGEN SATURATION: 96 % | BODY MASS INDEX: 35.03 KG/M2 | HEART RATE: 107 BPM | HEIGHT: 66 IN | DIASTOLIC BLOOD PRESSURE: 72 MMHG | WEIGHT: 218 LBS

## 2022-08-24 DIAGNOSIS — H92.03 OTALGIA OF BOTH EARS: Primary | ICD-10-CM

## 2022-08-24 DIAGNOSIS — G47.33 OBSTRUCTIVE SLEEP APNEA SYNDROME: ICD-10-CM

## 2022-08-24 DIAGNOSIS — F41.9 ANXIETY: ICD-10-CM

## 2022-08-24 PROCEDURE — 99214 OFFICE O/P EST MOD 30 MIN: CPT | Performed by: FAMILY MEDICINE

## 2022-08-24 RX ORDER — PREDNISONE 20 MG/1
40 TABLET ORAL DAILY
Qty: 10 TABLET | Refills: 0 | Status: SHIPPED | OUTPATIENT
Start: 2022-08-24 | End: 2022-08-29

## 2022-08-24 RX ORDER — DOXEPIN HYDROCHLORIDE 10 MG/1
10 CAPSULE ORAL 3 TIMES DAILY PRN
Qty: 90 CAPSULE | Refills: 1 | Status: SHIPPED | OUTPATIENT
Start: 2022-08-24 | End: 2022-10-31

## 2022-08-24 RX ORDER — LEVOFLOXACIN 500 MG/1
500 TABLET, FILM COATED ORAL DAILY
Qty: 10 TABLET | Refills: 0 | Status: SHIPPED | OUTPATIENT
Start: 2022-08-24 | End: 2022-09-12

## 2022-08-24 RX ORDER — BUPROPION HYDROCHLORIDE 300 MG/1
300 TABLET ORAL DAILY
Qty: 30 TABLET | Refills: 3 | Status: SHIPPED | OUTPATIENT
Start: 2022-08-24 | End: 2023-01-04

## 2022-08-24 RX ORDER — CEPHALEXIN 500 MG/1
TABLET ORAL 4 TIMES DAILY
COMMUNITY
Start: 2022-06-16 | End: 2022-09-12

## 2022-08-24 RX ORDER — FLUTICASONE PROPIONATE 50 MCG
2 SPRAY, SUSPENSION (ML) NASAL DAILY
Qty: 16 G | Refills: 0 | Status: SHIPPED | OUTPATIENT
Start: 2022-08-24 | End: 2022-10-20

## 2022-08-24 NOTE — TELEPHONE ENCOUNTER
Pharmacy called, updated instructions for doxepin 10mg to 1 tab 3 times per day as needed for anxiety

## 2022-08-25 NOTE — PROGRESS NOTES
Follow Up Office Visit      Date of Visit:  2022   Patient Name: Leena Mims  : 1970   MRN: [id number]     Chief Complaint:    Chief Complaint   Patient presents with   • Earache     Both ears       History of Present Illness: Leena Mims is a 52 y.o. female who is here today for follow up.  Visit initially scheduled for otalgia.  She has been having some ongoing ear pain.  Both ears.  Popping and cracking and some muffled hearing.  Patient also wanting to further discuss some anxiety that as ongoing.  Medications reviewed.  Patient also with a history of sleep apnea.  She has not been treated for a little while because her machine was not working.  She is requesting a new machine.        Subjective      Review of Systems:   Review of Systems   Constitutional: Negative for fatigue and fever.   HENT: Positive for ear pain. Negative for congestion.    Respiratory: Negative for apnea, cough, chest tightness and shortness of breath.    Cardiovascular: Negative for chest pain.   Gastrointestinal: Negative for abdominal pain, constipation, diarrhea and nausea.   Musculoskeletal: Negative for arthralgias.   Psychiatric/Behavioral: Negative for depressed mood and stress. The patient is nervous/anxious.        Past Medical History:   Past Medical History:   Diagnosis Date   • Arthritis    • Asthma    • Cholelithiasis    • COPD (chronic obstructive pulmonary disease) (HCC)    • Dizziness    • Dysmenorrhea    • Edema    • Fatigue    • Fibromyalgia    • GERD (gastroesophageal reflux disease)    • Heartburn    • IBS (irritable bowel syndrome)    • Kidney stone    • Multiple joint pain    • SULEMA (obstructive sleep apnea)     NO MACHINE   • Plantar fasciitis    • RLS (restless legs syndrome)    • Sciatica    • Urge and stress incontinence        Past Surgical History:   Past Surgical History:   Procedure Laterality Date   • COLONOSCOPY     • CYSTOSCOPY W/ LASER LITHOTRIPSY     • EAR TUBES     • ENDOSCOPY      • GASTRIC SLEEVE LAPAROSCOPIC N/A 2/14/2018    Procedure: GASTRIC SLEEVE LAPAROSCOPIC;  Surgeon: Ej Coleman Jr., MD;  [place] [facility] [place] [facility]  Service:    • HYSTERECTOMY     • LAPAROSCOPIC CHOLECYSTECTOMY     • TONSILLECTOMY         Family History:   Family History   Problem Relation Age of Onset   • Obesity Mother    • Stroke Mother    • Clotting disorder Mother    • Asthma Mother    • Hyperlipidemia Father    • Diabetes Sister    • Obesity Sister    • Diabetes Brother    • Breast cancer Neg Hx    • Ovarian cancer Neg Hx    • Malig Hyperthermia Neg Hx        Social History:   Social History     Socioeconomic History   • Marital status:    • Number of children: 3   Tobacco Use   • Smoking status: Current Every Day Smoker     Packs/day: 1.00     Years: 36.00     Pack years: 36.00     Types: Cigarettes     Start date: 1986   • Smokeless tobacco: Never Used   Substance and Sexual Activity   • Alcohol use: No   • Drug use: No   • Sexual activity: Defer       Medications:     Current Outpatient Medications:   •  albuterol sulfate HFA (ProAir HFA) 108 (90 Base) MCG/ACT inhaler, Inhale 2 puffs Every 4 (Four) Hours As Needed for Wheezing., Disp: 18 g, Rfl: 1  •  Cephalexin 500 MG tablet, Take  by mouth 4 (Four) Times a Day., Disp: , Rfl:   •  HYDROcodone-acetaminophen (NORCO) 7.5-325 MG per tablet, TAKE 1 TABLET 2 TO 3 TIMES A DAY **MUST LAST 30 DAYS, APRIL RX**, Disp: , Rfl:   •  omeprazole (priLOSEC) 40 MG capsule, Take 1 capsule by mouth Daily., Disp: 90 capsule, Rfl: 1  •  albuterol sulfate  (90 Base) MCG/ACT inhaler, Inhale 2 puffs Every 4 (Four) Hours As Needed for Wheezing., Disp: 18 g, Rfl: 1  •  buPROPion XL (Wellbutrin XL) 300 MG 24 hr tablet, Take 1 tablet by mouth Daily., Disp: 30 tablet, Rfl: 3  •  doxepin (SINEquan) 10 MG capsule, Take 1 capsule by mouth 3 (Three) Times a Day As Needed for Sleep., Disp: 90 capsule, Rfl: 1  •  fluticasone (Flonase) 50 MCG/ACT nasal spray, 2 sprays into  "the nostril(s) as directed by provider Daily., Disp: 16 g, Rfl: 0  •  levoFLOXacin (Levaquin) 500 MG tablet, Take 1 tablet by mouth Daily., Disp: 10 tablet, Rfl: 0  •  predniSONE (DELTASONE) 20 MG tablet, Take 2 tablets by mouth Daily for 5 days., Disp: 10 tablet, Rfl: 0    Allergies:   Allergies   Allergen Reactions   • Aspirin Other (See Comments)     HEARING TROUBLE   • Celebrex [Celecoxib] Other (See Comments)   • Contrast Dye Nausea And Vomiting   • Penicillins Other (See Comments)     seizures       Objective     Physical Exam:  Vital Signs:   Vitals:    08/24/22 1408   BP: 118/72   Pulse: 107   SpO2: 96%   Weight: 98.9 kg (218 lb)   Height: 166.4 cm (65.5\")     Body mass index is 35.73 kg/m².     Physical Exam  Vitals and nursing note reviewed.   Constitutional:       General: She is not in acute distress.     Appearance: Normal appearance. She is not ill-appearing.   HENT:      Head: Normocephalic and atraumatic.      Right Ear: Tympanic membrane and ear canal normal.      Left Ear: Tympanic membrane and ear canal normal.      Nose: Nose normal.   Cardiovascular:      Rate and Rhythm: Normal rate and regular rhythm.      Heart sounds: Normal heart sounds.   Pulmonary:      Effort: Pulmonary effort is normal.      Breath sounds: Normal breath sounds.   Neurological:      Mental Status: She is alert and oriented to person, place, and time. Mental status is at baseline.   Psychiatric:         Mood and Affect: Mood normal.         Procedures      Assessment / Plan      Assessment/Plan:   Diagnoses and all orders for this visit:    1. Otalgia of both ears (Primary)  -     Ambulatory Referral to ENT (Otolaryngology)    2. Obstructive sleep apnea syndrome  -     Miscellaneous DME    3. Anxiety    Other orders  -     fluticasone (Flonase) 50 MCG/ACT nasal spray; 2 sprays into the nostril(s) as directed by provider Daily.  Dispense: 16 g; Refill: 0  -     predniSONE (DELTASONE) 20 MG tablet; Take 2 tablets by mouth " Daily for 5 days.  Dispense: 10 tablet; Refill: 0  -     levoFLOXacin (Levaquin) 500 MG tablet; Take 1 tablet by mouth Daily.  Dispense: 10 tablet; Refill: 0  -     doxepin (SINEquan) 10 MG capsule; Take 1 capsule by mouth 3 (Three) Times a Day As Needed for Sleep.  Dispense: 90 capsule; Refill: 1  -     buPROPion XL (Wellbutrin XL) 300 MG 24 hr tablet; Take 1 tablet by mouth Daily.  Dispense: 30 tablet; Refill: 3         Gave course of antibiotics steroids and nasal steroid.  Did also make referral to ENT since this has been going ongoing for some time.  Patient also with history of obstructive sleep apnea and requires a new CPAP machine.  Her old one is no longer working.  She did benefit from using the machine and was compliant.  We also discussed her current symptoms with anxiety.  We increased her Wellbutrin to 300 mg.  Also gave trial of doxepin.    Follow Up:   No follow-ups on file.    Ye Eaton  [facility] [place]

## 2022-09-06 NOTE — TELEPHONE ENCOUNTER
discontinued 05/04, now pt requesting refill. Sending to [employer] to approve.     Last appt 08/24 (leos)   05/02/22 (crystal)   Last rx 03/16/22 8.5 w/ 0 RF Haleigh Shea.

## 2022-09-09 ENCOUNTER — TELEPHONE (OUTPATIENT)
Dept: FAMILY MEDICINE CLINIC | Facility: CLINIC | Age: 52
End: 2022-09-09

## 2022-09-09 NOTE — TELEPHONE ENCOUNTER
"[facility] contacted, shipping a new kit. \"Please allow 3-7 days to reach the patient\" Postponed order  "

## 2022-09-09 NOTE — TELEPHONE ENCOUNTER
"HUB TO READ  \"Left message to see if patient can schedule blood work appt, overdue orders for labs. Also does she want to do the cologuard?\"  "

## 2022-09-09 NOTE — TELEPHONE ENCOUNTER
[facility] did not receive order. Patient needs to be called to see if they still want the test ordered. If they do not, order needs to be cancelled. If they do, cancel old order and have provider re-order. Thanks.

## 2022-09-09 NOTE — TELEPHONE ENCOUNTER
Caller: Leena Mims    Relationship: Self    Best call back number: [phone]    What is the best time to reach you: ANY    What was the call regarding: PATIENT DOES WANT TO DO COLO-GUARD KIT THAT WAS REICIVED IN MAIL WAS DAMAGED WILL NEED NEW KIT     Do you require a callback:YES PLEASE HUB RELAYED MESSAGE PATIENT VERBALIZED UNDERSTANDING WITH RESPONSE LISTED ABOVE

## 2022-09-12 ENCOUNTER — OFFICE VISIT (OUTPATIENT)
Dept: FAMILY MEDICINE CLINIC | Facility: CLINIC | Age: 52
End: 2022-09-12

## 2022-09-12 VITALS
HEIGHT: 66 IN | HEART RATE: 96 BPM | WEIGHT: 215 LBS | DIASTOLIC BLOOD PRESSURE: 80 MMHG | BODY MASS INDEX: 34.55 KG/M2 | OXYGEN SATURATION: 95 % | SYSTOLIC BLOOD PRESSURE: 124 MMHG

## 2022-09-12 DIAGNOSIS — R59.1 LYMPHADENOPATHY: Primary | ICD-10-CM

## 2022-09-12 DIAGNOSIS — H92.03 OTALGIA OF BOTH EARS: ICD-10-CM

## 2022-09-12 PROCEDURE — 99213 OFFICE O/P EST LOW 20 MIN: CPT | Performed by: NURSE PRACTITIONER

## 2022-09-12 NOTE — PROGRESS NOTES
"Chief Complaint  lump on neck (Bilateral. Comes and goes over last 9 years. Pain radiates down neck)    Kristi Mims presents to [facility]  Pt has had intermittent swollen lymph nodes in bilateral neck for 9 years. She states the left side of her neck has been swollen and tender x 2 weeks. She denies cough, congestion, sore throat, skin lesions, fever, chills, or myalgias. She has had intermittent ear infections and problems for years as well.       Objective   Vital Signs:   /80   Pulse 96   Ht 166.4 cm (65.5\")   Wt 97.5 kg (215 lb)   SpO2 95%   BMI 35.23 kg/m²     Body mass index is 35.23 kg/m².    Review of Systems   Constitutional: Negative for chills, fatigue and fever.   HENT: Negative for congestion, rhinorrhea, sinus pressure and sore throat.    Respiratory: Negative for cough and shortness of breath.    Cardiovascular: Negative for chest pain, palpitations and leg swelling.   Skin: Negative for rash.   Neurological: Negative for syncope.   Psychiatric/Behavioral: The patient is not nervous/anxious.           Current Outpatient Medications:   •  buPROPion XL (Wellbutrin XL) 300 MG 24 hr tablet, Take 1 tablet by mouth Daily., Disp: 30 tablet, Rfl: 3  •  doxepin (SINEquan) 10 MG capsule, Take 1 capsule by mouth 3 (Three) Times a Day As Needed for Sleep., Disp: 90 capsule, Rfl: 1  •  fluticasone (Flonase) 50 MCG/ACT nasal spray, 2 sprays into the nostril(s) as directed by provider Daily., Disp: 16 g, Rfl: 0  •  HYDROcodone-acetaminophen (NORCO) 7.5-325 MG per tablet, TAKE 1 TABLET 2 TO 3 TIMES A DAY **MUST LAST 30 DAYS, APRIL RX**, Disp: , Rfl:   •  omeprazole (priLOSEC) 40 MG capsule, Take 1 capsule by mouth Daily., Disp: 90 capsule, Rfl: 1  •  ProAir  (90 Base) MCG/ACT inhaler, TAKE 2 PUFFS EVERY 4 HOURS AS NEEDED WHEEZING, Disp: 6.7 g, Rfl: 0      Allergies: Penicillins, Aspirin, Celebrex [celecoxib], Contrast dye, and " Nsaids    Physical Exam  Constitutional:       Appearance: Normal appearance.   HENT:      Head: Normocephalic.   Eyes:      Conjunctiva/sclera: Conjunctivae normal.      Pupils: Pupils are equal, round, and reactive to light.   Cardiovascular:      Rate and Rhythm: Normal rate and regular rhythm.      Heart sounds: Normal heart sounds.   Pulmonary:      Effort: Pulmonary effort is normal.      Breath sounds: Normal breath sounds.   Abdominal:      Tenderness: There is no abdominal tenderness.   Musculoskeletal:         General: Normal range of motion.   Lymphadenopathy:      Cervical: Cervical adenopathy present.      Left cervical: Posterior cervical adenopathy present.   Skin:     General: Skin is warm and dry.      Capillary Refill: Capillary refill takes less than 2 seconds.   Neurological:      General: No focal deficit present.      Mental Status: She is alert and oriented to person, place, and time.   Psychiatric:         Mood and Affect: Mood normal.         Behavior: Behavior normal.         Thought Content: Thought content normal.         Judgment: Judgment normal.          Result Review :                   Assessment and Plan    Diagnoses and all orders for this visit:    1. Lymphadenopathy (Primary)  Comments:  Follow up with ENT. Apply warm compresses to the lesion. Tylenol as needed for pain. Return for worsened sx.   Orders:  -     Ambulatory Referral to ENT (Otolaryngology)    2. Otalgia of both ears  Comments:  Follow up with ENT.   Orders:  -     Ambulatory Referral to ENT (Otolaryngology)                Follow Up   No follow-ups on file.  Patient was given instructions and counseling regarding her condition or for health maintenance advice. Please see specific information pulled into the AVS if appropriate.     ROXY Nava

## 2022-10-04 ENCOUNTER — TELEPHONE (OUTPATIENT)
Dept: FAMILY MEDICINE CLINIC | Facility: CLINIC | Age: 52
End: 2022-10-04

## 2022-10-20 RX ORDER — FLUTICASONE PROPIONATE 50 MCG
SPRAY, SUSPENSION (ML) NASAL
Qty: 16 ML | Refills: 0 | Status: SHIPPED | OUTPATIENT
Start: 2022-10-20 | End: 2022-11-21

## 2022-10-23 DIAGNOSIS — K21.9 GASTROESOPHAGEAL REFLUX DISEASE WITHOUT ESOPHAGITIS: ICD-10-CM

## 2022-10-24 RX ORDER — OMEPRAZOLE 40 MG/1
CAPSULE, DELAYED RELEASE ORAL
Qty: 90 CAPSULE | Refills: 1 | Status: SHIPPED | OUTPATIENT
Start: 2022-10-24

## 2022-10-31 RX ORDER — DOXEPIN HYDROCHLORIDE 10 MG/1
CAPSULE ORAL
Qty: 90 CAPSULE | Refills: 1 | Status: SHIPPED | OUTPATIENT
Start: 2022-10-31 | End: 2023-01-04

## 2022-11-02 RX ORDER — TRAZODONE HYDROCHLORIDE 50 MG/1
100 TABLET ORAL
Qty: 180 TABLET | Refills: 1 | OUTPATIENT
Start: 2022-11-02

## 2022-11-06 DIAGNOSIS — F41.8 DEPRESSION WITH ANXIETY: ICD-10-CM

## 2022-11-07 RX ORDER — BUSPIRONE HYDROCHLORIDE 10 MG/1
TABLET ORAL
Qty: 60 TABLET | Refills: 5 | OUTPATIENT
Start: 2022-11-07

## 2022-11-21 RX ORDER — FLUTICASONE PROPIONATE 50 MCG
SPRAY, SUSPENSION (ML) NASAL
Qty: 16 ML | Refills: 0 | Status: SHIPPED | OUTPATIENT
Start: 2022-11-21 | End: 2023-01-03

## 2023-01-03 RX ORDER — FLUTICASONE PROPIONATE 50 MCG
SPRAY, SUSPENSION (ML) NASAL
Qty: 16 ML | Refills: 0 | Status: SHIPPED | OUTPATIENT
Start: 2023-01-03 | End: 2023-03-06

## 2023-01-04 RX ORDER — DOXEPIN HYDROCHLORIDE 10 MG/1
CAPSULE ORAL
Qty: 90 CAPSULE | Refills: 2 | Status: SHIPPED | OUTPATIENT
Start: 2023-01-04 | End: 2023-03-09

## 2023-01-04 RX ORDER — BUPROPION HYDROCHLORIDE 300 MG/1
TABLET ORAL
Qty: 30 TABLET | Refills: 2 | Status: SHIPPED | OUTPATIENT
Start: 2023-01-04 | End: 2023-03-09

## 2023-02-06 RX ORDER — ALBUTEROL SULFATE 90 UG/1
AEROSOL, METERED RESPIRATORY (INHALATION)
Qty: 18 G | Refills: 0 | Status: SHIPPED | OUTPATIENT
Start: 2023-02-06

## 2023-02-16 ENCOUNTER — TELEPHONE (OUTPATIENT)
Dept: FAMILY MEDICINE CLINIC | Facility: CLINIC | Age: 53
End: 2023-02-16
Payer: MEDICAID

## 2023-03-06 RX ORDER — FLUTICASONE PROPIONATE 50 MCG
SPRAY, SUSPENSION (ML) NASAL
Qty: 16 ML | Refills: 0 | Status: SHIPPED | OUTPATIENT
Start: 2023-03-06

## 2023-03-09 RX ORDER — BUPROPION HYDROCHLORIDE 300 MG/1
TABLET ORAL
Qty: 30 TABLET | Refills: 2 | Status: SHIPPED | OUTPATIENT
Start: 2023-03-09

## 2023-03-09 RX ORDER — DOXEPIN HYDROCHLORIDE 10 MG/1
CAPSULE ORAL
Qty: 90 CAPSULE | Refills: 2 | Status: SHIPPED | OUTPATIENT
Start: 2023-03-09

## 2023-04-10 RX ORDER — FLUTICASONE PROPIONATE 50 MCG
SPRAY, SUSPENSION (ML) NASAL
Qty: 16 ML | Refills: 0 | Status: SHIPPED | OUTPATIENT
Start: 2023-04-10

## 2023-04-17 ENCOUNTER — TELEMEDICINE (OUTPATIENT)
Dept: FAMILY MEDICINE CLINIC | Facility: CLINIC | Age: 53
End: 2023-04-17
Payer: MEDICAID

## 2023-04-17 DIAGNOSIS — F41.8 DEPRESSION WITH ANXIETY: Primary | ICD-10-CM

## 2023-04-17 PROCEDURE — 99213 OFFICE O/P EST LOW 20 MIN: CPT | Performed by: PHYSICIAN ASSISTANT

## 2023-04-17 RX ORDER — HYDROXYZINE PAMOATE 25 MG/1
25 CAPSULE ORAL 3 TIMES DAILY PRN
Qty: 75 CAPSULE | Refills: 1 | Status: SHIPPED | OUTPATIENT
Start: 2023-04-17

## 2023-04-17 RX ORDER — FAMOTIDINE 40 MG/1
TABLET, FILM COATED ORAL
COMMUNITY
Start: 2023-03-01

## 2023-04-17 NOTE — PROGRESS NOTES
.    Mode of Visit: Video  Location of patient: in parked car  You have chosen to receive care through a telehealth visit.  Does the patient consent to use a video/audio connection for your medical care today? Yes  The visit included audio and video interaction. No technical issues occurred during this visit.     Discussed limitations to virtual visit and patient was agreeable to continue. Discussed that because of the limitations of minimal physical exam that if there is interval worsening they should present to the office for either curbside visit, or to the emergency department for further evaluation and definitive management. Patient was agreeable to this.    Mode of Visit: Video ([employer])  Location of patient: other: parked car  [place] [facility] [place] [facility]  You have chosen to receive care through a telehealth visit.  Does the patient consent to use a video/audio connection for your medical care today? Yes  The visit included audio and video interaction. No technical issues occurred during this video visit     Chief Complaint  Anxiety and Depression (Patient states she can't stop crying. She has lost 14 pounds in the last 2 weeks.)      Leena TR Mims presents to [facility]    Patient states that her anxiety has been much worse for the past four weeks. She is crying on and off during the visit. She states that she has been having panic attacks more and more frequently. She states that she is crying so hard that she feels like she cant breathe. She states that she is now getting panic attacks three to four times a day. She states that four weeks ago she began having more stress. She is currently not in any type of counseling. She states that she has not been physically abused. She continues wellbutrin 300 a day. She states that she has continues to go to work daily. She states that she has had a few suicidal thoughts but none for several weeks. She states that her anxiety has  been this bad in the past but not for some time.       Review of Systems    Objective   Vital Signs:   There were no vitals taken for this visit.    Physical Exam   Constitutional: She appears well-developed and well-nourished. She appears distressed.   Crying on and off during visit. Normal facial affect   Pulmonary/Chest: Effort normal.   Neurological: She is alert.          Assessment and Plan    Diagnoses and all orders for this visit:    1. Depression with anxiety (Primary)  -     hydrOXYzine pamoate (Vistaril) 25 MG capsule; Take 1 capsule by mouth 3 (Three) Times a Day As Needed for Anxiety.  Dispense: 75 capsule; Refill: 1  Continue- wellbutrin and will set her up with virtual behavioral health  -     Ambulatory Referral to Behavioral Health        Follow Up   No follow-ups on file.  Patient was given instructions and counseling regarding her condition or for health maintenance advice. Please see specific information pulled into the AVS if appropriate.

## 2023-04-28 DIAGNOSIS — K21.9 GASTROESOPHAGEAL REFLUX DISEASE WITHOUT ESOPHAGITIS: ICD-10-CM

## 2023-04-28 RX ORDER — OMEPRAZOLE 40 MG/1
CAPSULE, DELAYED RELEASE ORAL
Qty: 90 CAPSULE | Refills: 1 | Status: SHIPPED | OUTPATIENT
Start: 2023-04-28

## 2023-05-09 ENCOUNTER — TELEMEDICINE (OUTPATIENT)
Dept: FAMILY MEDICINE CLINIC | Facility: CLINIC | Age: 53
End: 2023-05-09
Payer: MEDICAID

## 2023-05-09 DIAGNOSIS — I10 PRIMARY HYPERTENSION: Primary | ICD-10-CM

## 2023-05-09 PROCEDURE — 99214 OFFICE O/P EST MOD 30 MIN: CPT | Performed by: FAMILY MEDICINE

## 2023-05-09 RX ORDER — AMLODIPINE BESYLATE AND BENAZEPRIL HYDROCHLORIDE 5; 20 MG/1; MG/1
1 CAPSULE ORAL DAILY
Qty: 30 CAPSULE | Refills: 2 | Status: SHIPPED | OUTPATIENT
Start: 2023-05-09

## 2023-05-09 RX ORDER — DOXEPIN HYDROCHLORIDE 25 MG/1
25 CAPSULE ORAL 2 TIMES DAILY
Qty: 60 CAPSULE | Refills: 2 | Status: SHIPPED | OUTPATIENT
Start: 2023-05-09

## 2023-05-09 RX ORDER — BUSPIRONE HYDROCHLORIDE 10 MG/1
10 TABLET ORAL 3 TIMES DAILY
Qty: 90 TABLET | Refills: 2 | Status: SHIPPED | OUTPATIENT
Start: 2023-05-09

## 2023-05-09 NOTE — PROGRESS NOTES
Mode of Visit: Video  Location of patient: home   Location of provider: Office.  You have chosen to receive care through a telehealth visit.  The patient has signed the video visit consent form.  The visit included audio and video interaction. No technical issues occurred during this visit.     Chief Complaint  Hypertension      Leena Mims presents to [facility]      Blood pressure has been elevated lately.  Blood pressures of around 200/100.  Headaches noted.  Not currently on medication.  Patient also with more stress and anxiousness.  Upcoming appointment with behavioral health.  Has not seen a great deal of relief of symptoms with medications that were started here recently.    Review of Systems   Constitutional: Negative for fatigue and fever.   HENT: Negative for congestion and ear pain.    Respiratory: Negative for apnea, cough, chest tightness and shortness of breath.    Cardiovascular: Negative for chest pain.   Gastrointestinal: Negative for abdominal pain, constipation, diarrhea and nausea.   Musculoskeletal: Negative for arthralgias.   Psychiatric/Behavioral: Positive for depressed mood. Negative for stress. The patient is nervous/anxious.        Objective   Vital Signs:   There were no vitals taken for this visit.    Physical Exam   Constitutional: She appears well-developed and well-nourished.   Psychiatric: She has a normal mood and affect.                   Assessment and Plan    Diagnoses and all orders for this visit:    1. Primary hypertension (Primary)  -     amLODIPine-benazepril (Lotrel) 5-20 MG per capsule; Take 1 capsule by mouth Daily.  Dispense: 30 capsule; Refill: 2  -     CBC Auto Differential; Future  -     Comprehensive Metabolic Panel; Future  -     Lipid Panel; Future  -     TSH; Future    Other orders  -     busPIRone (BUSPAR) 10 MG tablet; Take 1 tablet by mouth 3 (Three) Times a Day.  Dispense: 90 tablet; Refill: 2  -     doxepin (SINEquan)  25 MG capsule; Take 1 capsule by mouth 2 (Two) Times a Day.  Dispense: 60 capsule; Refill: 2        Increase doxepin to 25 mg and take twice daily.  Stop the hydroxyzine.  Increase BuSpar to 3 times daily.  Follow-up with behavioral health.    Follow Up   No follow-ups on file.  Patient was given instructions and counseling regarding her condition or for health maintenance advice. Please see specific information pulled into the AVS if appropriate.

## 2023-05-19 RX ORDER — FLUTICASONE PROPIONATE 50 MCG
SPRAY, SUSPENSION (ML) NASAL
Qty: 16 ML | Refills: 2 | Status: SHIPPED | OUTPATIENT
Start: 2023-05-19

## 2023-06-01 ENCOUNTER — TELEPHONE (OUTPATIENT)
Dept: BEHAVIORAL HEALTH | Facility: CLINIC | Age: 53
End: 2023-06-01

## 2023-06-01 ENCOUNTER — OFFICE VISIT (OUTPATIENT)
Dept: FAMILY MEDICINE CLINIC | Facility: CLINIC | Age: 53
End: 2023-06-01

## 2023-06-01 VITALS
HEIGHT: 66 IN | DIASTOLIC BLOOD PRESSURE: 92 MMHG | SYSTOLIC BLOOD PRESSURE: 170 MMHG | OXYGEN SATURATION: 98 % | BODY MASS INDEX: 31.57 KG/M2 | WEIGHT: 196.44 LBS | HEART RATE: 82 BPM

## 2023-06-01 DIAGNOSIS — I10 BENIGN ESSENTIAL HTN: Primary | ICD-10-CM

## 2023-06-01 DIAGNOSIS — F41.8 ANXIETY WITH DEPRESSION: ICD-10-CM

## 2023-06-01 DIAGNOSIS — Z13.220 SCREENING CHOLESTEROL LEVEL: ICD-10-CM

## 2023-06-01 PROCEDURE — 3077F SYST BP >= 140 MM HG: CPT | Performed by: NURSE PRACTITIONER

## 2023-06-01 PROCEDURE — 3080F DIAST BP >= 90 MM HG: CPT | Performed by: NURSE PRACTITIONER

## 2023-06-01 PROCEDURE — 1159F MED LIST DOCD IN RCRD: CPT | Performed by: NURSE PRACTITIONER

## 2023-06-01 PROCEDURE — 1160F RVW MEDS BY RX/DR IN RCRD: CPT | Performed by: NURSE PRACTITIONER

## 2023-06-01 PROCEDURE — 99214 OFFICE O/P EST MOD 30 MIN: CPT | Performed by: NURSE PRACTITIONER

## 2023-06-01 RX ORDER — DOXEPIN HYDROCHLORIDE 10 MG/1
CAPSULE ORAL
COMMUNITY
Start: 2023-05-29 | End: 2023-06-01

## 2023-06-01 RX ORDER — AMLODIPINE BESYLATE AND BENAZEPRIL HYDROCHLORIDE 10; 40 MG/1; MG/1
1 CAPSULE ORAL DAILY
Qty: 30 CAPSULE | Refills: 1 | Status: SHIPPED | OUTPATIENT
Start: 2023-06-01

## 2023-06-01 NOTE — ASSESSMENT & PLAN NOTE
We will increase amlodipine benazepril.  When she gets home today she will take an extra dose of the amlodipine benazepril 5/20.  She will monitor blood pressure daily and keep a log.  Goal blood pressure less than 140/90.  She will let me or her PCP Dr. Eaton know of results in 5 days, sooner if worsens.  Due to her uncontrolled blood pressure and family history of coronary artery disease will refer to cardiology for cardiac evaluation.  Proper diet and exercise plan discussed and encouraged.  Risk of meds discussed and understood.  Labs drawn.  UA completed.  Return to clinic or ED with any issues or concerns.

## 2023-06-01 NOTE — ASSESSMENT & PLAN NOTE
Denies medication adjustments today and is wanting us to check on her psych referral.  Since this was placed over a month ago I will see if we can get her in quickly somewhere.  PHQ-9 completed and discussed.  No thoughts of suicide or hurting herself or anyone else.  I gave her information to [facility] including their phone number and informed her this is an option if she feels like she needs to see someone quicker than what we can get her in with.  Education provided.  Return to clinic or ED with any issues or concerns.

## 2023-06-01 NOTE — PROGRESS NOTES
"Chief Complaint  Hypertension, Anxiety, and Depression    Subjective          Leena Mims presents to [facility]  History of Present Illness     Patient presents for follow-up of hypertension.  She has been on amlodipine benazepril 5/20 for about a month.  States her blood pressure has slightly improved but is typically still systolics 180s and diastolics in the 100s.  States at times she does get mild headaches.  She states she does have a strong family history of coronary artery disease and that she has never seen cardiology.  No chest pain no chest pressure no shortness of breath no trouble breathing.    She does bring in blood pressure readings for the past few weeks.  She checks blood pressure 3 times a day.  Typical numbers seem to be systolics 180s and 190s with diastolics in the 100s.    She also states for the past 2 months she has been going through divorce which is causing a lot of stress in her life.  She is on Wellbutrin and buspirone but she states she cannot really tell much of a difference since she has been on those.  No thoughts of suicide or hurting herself or anyone else.  She was referred to psychiatry in April but she states she has not heard of that appointment yet and is wanting me to check on this.  Denies medication adjustments today and just wants to get in with psychiatry.    Objective   Vital Signs:   /92   Pulse 82   Ht 166.4 cm (65.5\")   Wt 89.1 kg (196 lb 7 oz)   SpO2 98%   BMI 32.19 kg/m²     Body mass index is 32.19 kg/m².    Review of Systems   Constitutional: Negative for chills, fatigue and fever.   HENT: Negative for congestion.    Eyes: Negative for visual disturbance.   Respiratory: Negative for cough, shortness of breath and wheezing.    Cardiovascular: Negative.    Gastrointestinal: Negative for abdominal pain, constipation, diarrhea, nausea and vomiting.   Genitourinary: Negative for decreased urine volume, dysuria, frequency, " hematuria and urgency.   Musculoskeletal: Negative for back pain.   Skin: Negative for rash.   Neurological: Negative.    Psychiatric/Behavioral: Positive for depressed mood and stress. Negative for agitation, behavioral problems, decreased concentration, dysphoric mood, hallucinations, self-injury, sleep disturbance and negative for hyperactivity. The patient is nervous/anxious.        Past History:  Medical History: has a past medical history of Arthritis, Asthma, Cholelithiasis, COPD (chronic obstructive pulmonary disease), Dizziness, Dysmenorrhea, Edema, Fatigue, Fibromyalgia, GERD (gastroesophageal reflux disease), Heartburn, IBS (irritable bowel syndrome), Kidney stone, Multiple joint pain, SULEMA (obstructive sleep apnea), Plantar fasciitis, RLS (restless legs syndrome), Sciatica, and Urge and stress incontinence.   Surgical History: has a past surgical history that includes Hysterectomy; Tonsillectomy; Esophagogastroduodenoscopy; Laparoscopic cholecystectomy; Colonoscopy; Ear Tubes Removal; Cystoscopy w/ laser lithotripsy; and Gastric Sleeve (N/A, 2/14/2018).   Family History: family history includes Asthma in her mother; Clotting disorder in her mother; Diabetes in her brother and sister; Hyperlipidemia in her father; Obesity in her mother and sister; Stroke in her mother.   Social History: reports that she has been smoking cigarettes. She started smoking about 37 years ago. She has a 36.00 pack-year smoking history. She has never used smokeless tobacco. She reports that she does not drink alcohol and does not use drugs.    PHQ-2 Depression Screening  Little interest or pleasure in doing things? 0-->not at all   Feeling down, depressed, or hopeless? 1-->several days   PHQ-2 Total Score 1        PHQ-9 Depression Screening  Little interest or pleasure in doing things? 0-->not at all   Feeling down, depressed, or hopeless? 1-->several days   Trouble falling or staying asleep, or sleeping too much?     Feeling  tired or having little energy?     Poor appetite or overeating?     Feeling bad about yourself - or that you are a failure or have let yourself or your family down?     Trouble concentrating on things, such as reading the newspaper or watching television?     Moving or speaking so slowly that other people could have noticed? Or the opposite - being so fidgety or restless that you have been moving around a lot more than usual?     Thoughts that you would be better off dead, or of hurting yourself in some way?     PHQ-9 Total Score 1   If you checked off any problems, how difficult have these problems made it for you to do your work, take care of things at home, or get along with other people?       PHQ-9 Total Score: 1      Patient screened positive for depression based on a PHQ-9 score of 1 on 6/1/2023. Follow-up recommendations include: Referral to Mental Health specialist.         Current Outpatient Medications:   •  buPROPion XL (WELLBUTRIN XL) 300 MG 24 hr tablet, TAKE 1 TABLET BY MOUTH EVERY DAY, Disp: 30 tablet, Rfl: 2  •  busPIRone (BUSPAR) 10 MG tablet, Take 1 tablet by mouth 3 (Three) Times a Day., Disp: 90 tablet, Rfl: 2  •  famotidine (PEPCID) 40 MG tablet, TAKE 1 TABLET BY MOUTH EVERY DAY AT BEDTIME AS DIRECTED, Disp: , Rfl:   •  fluticasone (FLONASE) 50 MCG/ACT nasal spray, SPRAY 2 SPRAYS INTO THE NOSTRIL DAILY AS DIRECTED BY PROVIDER, Disp: 16 mL, Rfl: 2  •  omeprazole (priLOSEC) 40 MG capsule, TAKE 1 CAPSULE BY MOUTH EVERY DAY, Disp: 90 capsule, Rfl: 1  •  Ventolin  (90 Base) MCG/ACT inhaler, TAKE 2 PUFFS EVERY 4 HOURS AS NEEDED FOR WHEEZING, Disp: 18 g, Rfl: 0  •  amLODIPine-benazepril (LOTREL) 10-40 MG per capsule, Take 1 capsule by mouth Daily., Disp: 30 capsule, Rfl: 1   (Not in a hospital admission)     Allergies: Penicillins, Aspirin, Celebrex [celecoxib], Contrast dye (echo or unknown ct/mr), and Nsaids    Physical Exam  Constitutional:       Appearance: Normal appearance.   Eyes:       Conjunctiva/sclera: Conjunctivae normal.      Pupils: Pupils are equal, round, and reactive to light.   Cardiovascular:      Rate and Rhythm: Normal rate and regular rhythm.      Heart sounds: Normal heart sounds.   Pulmonary:      Effort: Pulmonary effort is normal.      Breath sounds: Normal breath sounds.   Neurological:      General: No focal deficit present.      Mental Status: She is alert and oriented to person, place, and time. Mental status is at baseline.      Motor: Motor function is intact.      Coordination: Coordination is intact.      Gait: Gait is intact.   Psychiatric:         Attention and Perception: Attention and perception normal.         Mood and Affect: Mood and affect normal.         Speech: Speech normal.         Behavior: Behavior normal. Behavior is cooperative.         Thought Content: Thought content normal. Thought content does not include homicidal or suicidal ideation. Thought content does not include homicidal or suicidal plan.         Cognition and Memory: Cognition and memory normal.         Judgment: Judgment normal.          Result Review :                   Assessment and Plan    Diagnoses and all orders for this visit:    1. Benign essential HTN (Primary)  Assessment & Plan:  We will increase amlodipine benazepril.  When she gets home today she will take an extra dose of the amlodipine benazepril 5/20.  She will monitor blood pressure daily and keep a log.  Goal blood pressure less than 140/90.  She will let me or her PCP Dr. Eaton know of results in 5 days, sooner if worsens.  Due to her uncontrolled blood pressure and family history of coronary artery disease will refer to cardiology for cardiac evaluation.  Proper diet and exercise plan discussed and encouraged.  Risk of meds discussed and understood.  Labs drawn.  UA completed.  Return to clinic or ED with any issues or concerns.    Orders:  -     Ambulatory Referral to Cardiology  -     CBC & Differential  -      Comprehensive Metabolic Panel  -     TSH Rfx On Abnormal To Free T4  -     POC Urinalysis Dipstick, Automated; Future  -     amLODIPine-benazepril (LOTREL) 10-40 MG per capsule; Take 1 capsule by mouth Daily.  Dispense: 30 capsule; Refill: 1    2. Screening cholesterol level  -     Lipid Panel    3. Anxiety with depression  Assessment & Plan:  Denies medication adjustments today and is wanting us to check on her psych referral.  Since this was placed over a month ago I will see if we can get her in quickly somewhere.  PHQ-9 completed and discussed.  No thoughts of suicide or hurting herself or anyone else.  I gave her information to [facility] including their phone number and informed her this is an option if she feels like she needs to see someone quicker than what we can get her in with.  Education provided.  Return to clinic or ED with any issues or concerns.    Orders:  -     Ambulatory Referral to Psychiatry            BMI is >= 30 and <35. (Class 1 Obesity). The following options were offered after discussion;: exercise counseling/recommendations and nutrition counseling/recommendations       Follow Up   Return in about 4 weeks (around 6/29/2023), or if symptoms worsen or fail to improve.  Patient was given instructions and counseling regarding her condition or for health maintenance advice. Please see specific information pulled into the AVS if appropriate.     ROXY Osei

## 2023-06-02 LAB
ALBUMIN SERPL-MCNC: 4.3 G/DL (ref 3.8–4.9)
ALBUMIN/GLOB SERPL: 1.9 {RATIO} (ref 1.2–2.2)
ALP SERPL-CCNC: 98 IU/L (ref 44–121)
ALT SERPL-CCNC: 15 IU/L (ref 0–32)
AST SERPL-CCNC: 14 IU/L (ref 0–40)
BASOPHILS # BLD AUTO: 0 X10E3/UL (ref 0–0.2)
BASOPHILS NFR BLD AUTO: 1 %
BILIRUB SERPL-MCNC: 0.2 MG/DL (ref 0–1.2)
BUN SERPL-MCNC: 12 MG/DL (ref 6–24)
BUN/CREAT SERPL: 19 (ref 9–23)
CALCIUM SERPL-MCNC: 9.5 MG/DL (ref 8.7–10.2)
CHLORIDE SERPL-SCNC: 104 MMOL/L (ref 96–106)
CHOLEST SERPL-MCNC: 180 MG/DL (ref 100–199)
CO2 SERPL-SCNC: 24 MMOL/L (ref 20–29)
CREAT SERPL-MCNC: 0.62 MG/DL (ref 0.57–1)
EGFRCR SERPLBLD CKD-EPI 2021: 108 ML/MIN/1.73
EOSINOPHIL # BLD AUTO: 0.2 X10E3/UL (ref 0–0.4)
EOSINOPHIL NFR BLD AUTO: 2 %
ERYTHROCYTE [DISTWIDTH] IN BLOOD BY AUTOMATED COUNT: 12.9 % (ref 11.7–15.4)
GLOBULIN SER CALC-MCNC: 2.3 G/DL (ref 1.5–4.5)
GLUCOSE SERPL-MCNC: 86 MG/DL (ref 70–99)
HCT VFR BLD AUTO: 44.7 % (ref 34–46.6)
HDLC SERPL-MCNC: 56 MG/DL
HGB BLD-MCNC: 15.2 G/DL (ref 11.1–15.9)
IMM GRANULOCYTES # BLD AUTO: 0 X10E3/UL (ref 0–0.1)
IMM GRANULOCYTES NFR BLD AUTO: 0 %
LDLC SERPL CALC-MCNC: 100 MG/DL (ref 0–99)
LYMPHOCYTES # BLD AUTO: 2.7 X10E3/UL (ref 0.7–3.1)
LYMPHOCYTES NFR BLD AUTO: 34 %
MCH RBC QN AUTO: 32.5 PG (ref 26.6–33)
MCHC RBC AUTO-ENTMCNC: 34 G/DL (ref 31.5–35.7)
MCV RBC AUTO: 96 FL (ref 79–97)
MONOCYTES # BLD AUTO: 0.6 X10E3/UL (ref 0.1–0.9)
MONOCYTES NFR BLD AUTO: 8 %
NEUTROPHILS # BLD AUTO: 4.4 X10E3/UL (ref 1.4–7)
NEUTROPHILS NFR BLD AUTO: 55 %
PLATELET # BLD AUTO: 240 X10E3/UL (ref 150–450)
POTASSIUM SERPL-SCNC: 4.9 MMOL/L (ref 3.5–5.2)
PROT SERPL-MCNC: 6.6 G/DL (ref 6–8.5)
RBC # BLD AUTO: 4.67 X10E6/UL (ref 3.77–5.28)
SODIUM SERPL-SCNC: 140 MMOL/L (ref 134–144)
T4 FREE SERPL-MCNC: 1.08 NG/DL (ref 0.82–1.77)
TRIGL SERPL-MCNC: 138 MG/DL (ref 0–149)
TSH SERPL DL<=0.005 MIU/L-ACNC: 0.36 UIU/ML (ref 0.45–4.5)
VLDLC SERPL CALC-MCNC: 24 MG/DL (ref 5–40)
WBC # BLD AUTO: 7.8 X10E3/UL (ref 3.4–10.8)

## 2023-06-12 ENCOUNTER — TELEPHONE (OUTPATIENT)
Dept: FAMILY MEDICINE CLINIC | Facility: CLINIC | Age: 53
End: 2023-06-12
Payer: MEDICAID

## 2023-06-12 DIAGNOSIS — R79.89 LOW TSH LEVEL: Primary | ICD-10-CM

## 2023-06-12 NOTE — TELEPHONE ENCOUNTER
Caller: Leena Mims    Relationship: Self    Best call back number: [phone]    Caller requesting test results: YES     What test was performed: LABS    When was the test performed: 06/01/2023    Where was the test performed: IN OFFICE     Additional notes: PATIENT HAD LABS DONE 06/01/23. SOMEONE MAY HAVE CALLED HER HOWEVER I JUST UPDATED HER CHART WITH HER NEW NUMBER, PLEASE ADVISE AND CALL PATIENT WITH HER RESULTS.

## 2023-06-13 ENCOUNTER — TELEPHONE (OUTPATIENT)
Dept: FAMILY MEDICINE CLINIC | Facility: CLINIC | Age: 53
End: 2023-06-13
Payer: MEDICAID

## 2023-06-13 RX ORDER — ATORVASTATIN CALCIUM 10 MG/1
10 TABLET, FILM COATED ORAL DAILY
Qty: 90 TABLET | Refills: 0 | Status: SHIPPED | OUTPATIENT
Start: 2023-06-13

## 2023-06-13 NOTE — TELEPHONE ENCOUNTER
Pt called back and would like to start cholesterol medicine and also referral for endocrinologist
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
## 2023-06-13 NOTE — TELEPHONE ENCOUNTER
See below. She did not want to make any med adjustments when I saw her..  They are from the medications that Dr. Eaton started her on so recommend sending the message to him.  Thanks

## 2023-06-14 NOTE — TELEPHONE ENCOUNTER
Left message to call back- Atorvastatin was called in and referral for endocrinology has been sent in.    6/14/2023 16:25  Patient picked up medicine and made an appt with endocrinology.

## 2023-06-18 NOTE — TELEPHONE ENCOUNTER
I told her at the time of the visit which was telehealth, that it probably be best if she followed up through behavioral health.  I would not be able to adjust medication without visit.

## 2023-07-25 ENCOUNTER — OFFICE VISIT (OUTPATIENT)
Dept: FAMILY MEDICINE CLINIC | Facility: CLINIC | Age: 53
End: 2023-07-25
Payer: COMMERCIAL

## 2023-07-25 VITALS
DIASTOLIC BLOOD PRESSURE: 100 MMHG | HEART RATE: 77 BPM | OXYGEN SATURATION: 99 % | WEIGHT: 186 LBS | HEIGHT: 65 IN | SYSTOLIC BLOOD PRESSURE: 198 MMHG | BODY MASS INDEX: 30.99 KG/M2

## 2023-07-25 DIAGNOSIS — I10 BENIGN ESSENTIAL HTN: Primary | ICD-10-CM

## 2023-07-25 DIAGNOSIS — F41.8 DEPRESSION WITH ANXIETY: ICD-10-CM

## 2023-07-25 DIAGNOSIS — F51.01 PRIMARY INSOMNIA: ICD-10-CM

## 2023-07-25 PROCEDURE — 3080F DIAST BP >= 90 MM HG: CPT | Performed by: FAMILY MEDICINE

## 2023-07-25 PROCEDURE — 3077F SYST BP >= 140 MM HG: CPT | Performed by: FAMILY MEDICINE

## 2023-07-25 PROCEDURE — 99214 OFFICE O/P EST MOD 30 MIN: CPT | Performed by: FAMILY MEDICINE

## 2023-07-25 RX ORDER — VENLAFAXINE HYDROCHLORIDE 37.5 MG/1
37.5 CAPSULE, EXTENDED RELEASE ORAL DAILY
Qty: 30 CAPSULE | Refills: 2 | Status: SHIPPED | OUTPATIENT
Start: 2023-07-25

## 2023-07-25 RX ORDER — LAMOTRIGINE 25 MG/1
25 TABLET ORAL 2 TIMES DAILY
Qty: 60 TABLET | Refills: 2 | Status: SHIPPED | OUTPATIENT
Start: 2023-07-25

## 2023-07-25 RX ORDER — TRAZODONE HYDROCHLORIDE 50 MG/1
50 TABLET ORAL NIGHTLY
COMMUNITY

## 2023-07-26 NOTE — PROGRESS NOTES
Follow Up Office Visit      Date of Visit:  2023   Patient Name: Leena Mims  : 1970   MRN: [id number]     Chief Complaint:    Chief Complaint   Patient presents with    Hypertension       History of Present Illness: Leena Mims is a 52 y.o. female who is here today for follow up.  Patient seen today for recheck on her blood pressure.  Still with very elevated blood pressure.  Also with uncontrolled anxiety and depression.  There has been an appointment set up for behavioral health but it is not for a few more weeks.  Stress is most likely contributing a great deal to the blood pressure being elevated.        Subjective      Review of Systems:   Review of Systems   Constitutional:  Negative for fatigue and fever.   HENT:  Negative for congestion and ear pain.    Respiratory:  Negative for apnea, cough, chest tightness and shortness of breath.    Cardiovascular:  Negative for chest pain.   Gastrointestinal:  Negative for abdominal pain, constipation, diarrhea and nausea.   Musculoskeletal:  Negative for arthralgias.   Psychiatric/Behavioral:  Negative for depressed mood and stress.      Past Medical History:   Past Medical History:   Diagnosis Date    Arthritis     Asthma     Cholelithiasis     COPD (chronic obstructive pulmonary disease)     Dizziness     Dysmenorrhea     Edema     Fatigue     Fibromyalgia     GERD (gastroesophageal reflux disease)     Heartburn     IBS (irritable bowel syndrome)     Kidney stone     Multiple joint pain     SULEMA (obstructive sleep apnea)     NO MACHINE    Plantar fasciitis     RLS (restless legs syndrome)     Sciatica     Urge and stress incontinence        Past Surgical History:   Past Surgical History:   Procedure Laterality Date    COLONOSCOPY      CYSTOSCOPY W/ LASER LITHOTRIPSY      EAR TUBES      ENDOSCOPY      GASTRIC SLEEVE LAPAROSCOPIC N/A 2018    Procedure: GASTRIC SLEEVE LAPAROSCOPIC;  Surgeon: Ej Coleman Jr., MD;  Location: [facility];  Service:     HYSTERECTOMY      LAPAROSCOPIC CHOLECYSTECTOMY      TONSILLECTOMY         Family History:   Family History   Problem Relation Age of Onset    Obesity Mother     Stroke Mother     Clotting disorder Mother     Asthma Mother     Hyperlipidemia Father     Diabetes Sister     Obesity Sister     Diabetes Brother     Breast cancer Neg Hx     Ovarian cancer Neg Hx     Malig Hyperthermia Neg Hx        Social History:   Social History     Socioeconomic History    Marital status:     Number of children: 3   Tobacco Use    Smoking status: Every Day     Packs/day: 1.00     Years: 36.00     Pack years: 36.00     Types: Cigarettes     Start date: 1986    Smokeless tobacco: Never   Vaping Use    Vaping Use: Never used   Substance and Sexual Activity    Alcohol use: No    Drug use: No    Sexual activity: Defer       Medications:     Current Outpatient Medications:     amLODIPine-benazepril (LOTREL) 10-40 MG per capsule, Take 1 capsule by mouth Daily., Disp: 30 capsule, Rfl: 1    atorvastatin (LIPITOR) 10 MG tablet, Take 1 tablet by mouth Daily., Disp: 90 tablet, Rfl: 0    famotidine (PEPCID) 40 MG tablet, TAKE 1 TABLET BY MOUTH EVERY DAY AT BEDTIME AS DIRECTED, Disp: , Rfl:     fluticasone (FLONASE) 50 MCG/ACT nasal spray, SPRAY 2 SPRAYS INTO THE NOSTRIL DAILY AS DIRECTED BY PROVIDER, Disp: 16 mL, Rfl: 2    omeprazole (priLOSEC) 40 MG capsule, TAKE 1 CAPSULE BY MOUTH EVERY DAY, Disp: 90 capsule, Rfl: 1    Ventolin  (90 Base) MCG/ACT inhaler, TAKE 2 PUFFS EVERY 4 HOURS AS NEEDED FOR WHEEZING, Disp: 18 g, Rfl: 0    buPROPion XL (WELLBUTRIN XL) 300 MG 24 hr tablet, TAKE 1 TABLET BY MOUTH EVERY DAY (Patient not taking: Reported on 7/25/2023), Disp: 30 tablet, Rfl: 2    busPIRone (BUSPAR) 10 MG tablet, Take 1 tablet by mouth 3 (Three) Times a Day. (Patient not taking: Reported on 7/25/2023), Disp: 90 tablet, Rfl: 2    lamoTRIgine (LaMICtal) 25 MG tablet, Take 1 tablet by mouth 2 (Two) Times a Day., Disp:  "60 tablet, Rfl: 2    metoprolol tartrate (LOPRESSOR) 25 MG tablet, Take 1 tablet by mouth 2 (Two) Times a Day., Disp: 60 tablet, Rfl: 5    traZODone (DESYREL) 50 MG tablet, Take 1 tablet by mouth Every Night., Disp: , Rfl:     venlafaxine XR (Effexor XR) 37.5 MG 24 hr capsule, Take 1 capsule by mouth Daily., Disp: 30 capsule, Rfl: 2    Allergies:   Allergies   Allergen Reactions    Penicillins Other (See Comments)     seizures    Aspirin Other (See Comments)     HEARING TROUBLE    Celebrex [Celecoxib] Other (See Comments)     Flu like symptoms    Contrast Dye (Echo Or Unknown Ct/Mr) Nausea And Vomiting    Nsaids Other (See Comments)     Just not allowed to take       Objective     Physical Exam:  Vital Signs:   Vitals:    07/25/23 1542   BP: (!) 198/100   BP Location: Left arm   Patient Position: Sitting   Cuff Size: Adult   Pulse: 77   SpO2: 99%   Weight: 84.4 kg (186 lb)   Height: 165.1 cm (65\")     Body mass index is 30.95 kg/m².     Physical Exam  Vitals and nursing note reviewed.   Constitutional:       General: She is not in acute distress.     Appearance: Normal appearance. She is not ill-appearing.   HENT:      Head: Normocephalic and atraumatic.      Right Ear: Tympanic membrane and ear canal normal.      Left Ear: Tympanic membrane and ear canal normal.      Nose: Nose normal.   Cardiovascular:      Rate and Rhythm: Normal rate and regular rhythm.      Heart sounds: Normal heart sounds.   Pulmonary:      Effort: Pulmonary effort is normal.      Breath sounds: Normal breath sounds.   Neurological:      Mental Status: She is alert and oriented to person, place, and time. Mental status is at baseline.   Psychiatric:         Mood and Affect: Mood normal.       Procedures      Assessment / Plan      Assessment/Plan:   Diagnoses and all orders for this visit:    1. Benign essential HTN (Primary)  -     metoprolol tartrate (LOPRESSOR) 25 MG tablet; Take 1 tablet by mouth 2 (Two) Times a Day.  Dispense: 60 tablet; " Refill: 5    2. Depression with anxiety  -     lamoTRIgine (LaMICtal) 25 MG tablet; Take 1 tablet by mouth 2 (Two) Times a Day.  Dispense: 60 tablet; Refill: 2  -     venlafaxine XR (Effexor XR) 37.5 MG 24 hr capsule; Take 1 capsule by mouth Daily.  Dispense: 30 capsule; Refill: 2    3. Primary insomnia         Will add blood pressure medication.  Change medication for depression.  Keep follow-up for behavioral health.    Follow Up:   No follow-ups on file.    Ye Eaton  [facility]

## 2023-07-28 DIAGNOSIS — I10 BENIGN ESSENTIAL HTN: ICD-10-CM

## 2023-07-28 RX ORDER — AMLODIPINE AND BENAZEPRIL HYDROCHLORIDE 10; 40 MG/1; MG/1
CAPSULE ORAL
Qty: 30 CAPSULE | Refills: 1 | Status: SHIPPED | OUTPATIENT
Start: 2023-07-28

## 2023-08-06 RX ORDER — ALBUTEROL SULFATE 90 UG/1
AEROSOL, METERED RESPIRATORY (INHALATION)
Qty: 18 G | Refills: 1 | Status: SHIPPED | OUTPATIENT
Start: 2023-08-06

## 2023-08-11 NOTE — TELEPHONE ENCOUNTER
Rx Refill Note  Requested Prescriptions     Pending Prescriptions Disp Refills    busPIRone (BUSPAR) 10 MG tablet [Pharmacy Med Name: BUSPIRONE HCL 10 MG TABLET] 90 tablet 2     Sig: TAKE 1 TABLET BY MOUTH THREE TIMES A DAY    doxepin (SINEquan) 25 MG capsule [Pharmacy Med Name: DOXEPIN 25 MG CAPSULE] 60 capsule 2     Sig: TAKE 1 CAPSULE BY MOUTH TWICE A DAY      Last office visit with prescribing clinician: 7/25/2023   Last telemedicine visit with prescribing clinician: 5/9/2023   Next office visit with prescribing clinician: 8/25/2023                         Would you like a call back once the refill request has been completed: [] Yes [] No    If the office needs to give you a call back, can they leave a voicemail: [] Yes [] No    Valentina Helton MA  08/11/23, 15:04 EDT

## 2023-08-13 RX ORDER — BUSPIRONE HYDROCHLORIDE 10 MG/1
TABLET ORAL
Qty: 90 TABLET | Refills: 2 | Status: SHIPPED | OUTPATIENT
Start: 2023-08-13

## 2023-08-13 RX ORDER — DOXEPIN HYDROCHLORIDE 25 MG/1
CAPSULE ORAL
Qty: 60 CAPSULE | Refills: 2 | Status: SHIPPED | OUTPATIENT
Start: 2023-08-13

## 2023-09-11 RX ORDER — ATORVASTATIN CALCIUM 10 MG/1
TABLET, FILM COATED ORAL
Qty: 90 TABLET | Refills: 0 | Status: SHIPPED | OUTPATIENT
Start: 2023-09-11

## 2023-10-03 ENCOUNTER — TELEPHONE (OUTPATIENT)
Dept: FAMILY MEDICINE CLINIC | Facility: CLINIC | Age: 53
End: 2023-10-03
Payer: COMMERCIAL

## 2023-10-03 NOTE — TELEPHONE ENCOUNTER
Hub staff attempted to follow warm transfer process and was unsuccessful     Caller: Leena Mims    Relationship to patient: Self    Best call back number:   [phone]    Patient is needing: SAME DAY APPOINTMENT FOR INFECTED THUMB

## 2023-10-03 NOTE — TELEPHONE ENCOUNTER
PATIENT WAS SCHEDULED FOR NEXT AVAILABLE APPOINTMENT WITH AN [facility] WHICH WAS ON FRIDAY 10/6/23 WITH ROXY CRUM.     PATIENT IS REQUESTING AN APPOINTMENT TOMORROW 10/2/23 IF AT ALL POSSIBLE.    SHE STATED THAT THE ISSUE WITH HER THUMB IS EXTREMELY CONCERNING TO HER.    PLEASE ADVISE PATIENT IF WE ARE ABLE TO WORK HER IN SOONER THAN 10/6/23

## 2024-02-13 ENCOUNTER — TELEPHONE (OUTPATIENT)
Dept: FAMILY MEDICINE CLINIC | Facility: CLINIC | Age: 54
End: 2024-02-13
Payer: COMMERCIAL

## 2024-02-13 DIAGNOSIS — I10 BENIGN ESSENTIAL HTN: ICD-10-CM

## 2024-02-13 DIAGNOSIS — K21.9 GASTROESOPHAGEAL REFLUX DISEASE WITHOUT ESOPHAGITIS: ICD-10-CM

## 2024-02-15 RX ORDER — ALBUTEROL SULFATE 90 UG/1
2 AEROSOL, METERED RESPIRATORY (INHALATION) EVERY 4 HOURS PRN
Qty: 18 G | Refills: 1 | Status: SHIPPED | OUTPATIENT
Start: 2024-02-15

## 2024-02-15 RX ORDER — ATORVASTATIN CALCIUM 10 MG/1
10 TABLET, FILM COATED ORAL DAILY
Qty: 30 TABLET | Refills: 1 | Status: SHIPPED | OUTPATIENT
Start: 2024-02-15

## 2024-02-15 RX ORDER — FLUTICASONE PROPIONATE 50 MCG
2 SPRAY, SUSPENSION (ML) NASAL DAILY
Qty: 16 ML | Refills: 1 | Status: SHIPPED | OUTPATIENT
Start: 2024-02-15

## 2024-02-15 RX ORDER — AMLODIPINE AND BENAZEPRIL HYDROCHLORIDE 10; 40 MG/1; MG/1
1 CAPSULE ORAL DAILY
Qty: 30 CAPSULE | Refills: 1 | Status: SHIPPED | OUTPATIENT
Start: 2024-02-15

## 2024-02-15 RX ORDER — OMEPRAZOLE 40 MG/1
40 CAPSULE, DELAYED RELEASE ORAL DAILY
Qty: 30 CAPSULE | Refills: 1 | Status: SHIPPED | OUTPATIENT
Start: 2024-02-15

## 2024-02-15 RX ORDER — FAMOTIDINE 40 MG/1
40 TABLET, FILM COATED ORAL NIGHTLY PRN
Qty: 30 TABLET | Refills: 1 | Status: SHIPPED | OUTPATIENT
Start: 2024-02-15

## 2024-03-07 ENCOUNTER — OFFICE VISIT (OUTPATIENT)
Dept: FAMILY MEDICINE CLINIC | Facility: CLINIC | Age: 54
End: 2024-03-07
Payer: COMMERCIAL

## 2024-03-07 VITALS
WEIGHT: 160 LBS | OXYGEN SATURATION: 96 % | DIASTOLIC BLOOD PRESSURE: 76 MMHG | HEART RATE: 86 BPM | SYSTOLIC BLOOD PRESSURE: 140 MMHG | BODY MASS INDEX: 26.63 KG/M2

## 2024-03-07 DIAGNOSIS — R10.32 LEFT LOWER QUADRANT ABDOMINAL PAIN: ICD-10-CM

## 2024-03-07 DIAGNOSIS — M25.50 MULTIPLE JOINT PAIN: ICD-10-CM

## 2024-03-07 DIAGNOSIS — M54.50 LOW BACK PAIN, UNSPECIFIED BACK PAIN LATERALITY, UNSPECIFIED CHRONICITY, UNSPECIFIED WHETHER SCIATICA PRESENT: ICD-10-CM

## 2024-03-07 DIAGNOSIS — I10 BENIGN ESSENTIAL HTN: Primary | ICD-10-CM

## 2024-03-07 DIAGNOSIS — R30.0 DYSURIA: ICD-10-CM

## 2024-03-07 LAB
BILIRUB BLD-MCNC: NEGATIVE MG/DL
CLARITY, POC: ABNORMAL
COLOR UR: YELLOW
EXPIRATION DATE: ABNORMAL
GLUCOSE UR STRIP-MCNC: NEGATIVE MG/DL
KETONES UR QL: NEGATIVE
LEUKOCYTE EST, POC: ABNORMAL
Lab: ABNORMAL
NITRITE UR-MCNC: POSITIVE MG/ML
PH UR: 6 [PH] (ref 5–8)
POC AMPHETAMINES: NEGATIVE
POC BARBITURATES: NEGATIVE
POC BENZODIAZEPHINES: NEGATIVE
POC COCAINE: NEGATIVE
POC METHADONE: NEGATIVE
POC METHAMPHETAMINE SCREEN URINE: NEGATIVE
POC OPIATES: POSITIVE
POC OXYCODONE: POSITIVE
POC PHENCYCLIDINE: NEGATIVE
POC PROPOXYPHENE: NEGATIVE
POC THC: POSITIVE
POC TRICYCLIC ANTIDEPRESSANTS: NEGATIVE
PROT UR STRIP-MCNC: NEGATIVE MG/DL
RBC # UR STRIP: ABNORMAL /UL
SP GR UR: 1.02 (ref 1–1.03)
UROBILINOGEN UR QL: NORMAL

## 2024-03-07 RX ORDER — AMLODIPINE BESYLATE 5 MG/1
5 TABLET ORAL DAILY
Qty: 30 TABLET | Refills: 2 | Status: SHIPPED | OUTPATIENT
Start: 2024-03-07

## 2024-03-07 NOTE — PROGRESS NOTES
Follow Up Office Visit      Date of Visit:  2024   Patient Name: Leena Mims  : 1970   MRN: [id number]     Chief Complaint:    Chief Complaint   Patient presents with    Hypertension    Back Pain       History of Present Illness: Leena Mims is a 53 y.o. female who is here today for follow up.  Multiple reasons patient needed seen today.  Patient does have a diagnosis of hypertension.  Patient says that medicines more recently that she is been taking has not helped and has almost fluctuations in numbers.  Blood pressure up-and-down.  Patient also with dysuria.  Started within the last few days.  Some left flank pain.  Patient over time also complains of lot of pain related to her low back.  Ongoing chronic issue.  Has seen pain management.  Says that she was dismissed from pain management for a positive urine drug screen last year.        Subjective      Review of Systems:   Review of Systems   Constitutional:  Negative for fatigue and fever.   HENT:  Negative for congestion and ear pain.    Respiratory:  Negative for apnea, cough, chest tightness and shortness of breath.    Cardiovascular:  Negative for chest pain.   Gastrointestinal:  Negative for abdominal pain, constipation, diarrhea and nausea.   Genitourinary:  Positive for urgency.   Musculoskeletal:  Positive for arthralgias and back pain.   Psychiatric/Behavioral:  Negative for depressed mood and stress.        Past Medical History:   Past Medical History:   Diagnosis Date    Arthritis     Asthma     Cholelithiasis     COPD (chronic obstructive pulmonary disease)     Dizziness     Dysmenorrhea     Edema     Fatigue     Fibromyalgia     GERD (gastroesophageal reflux disease)     Heartburn     IBS (irritable bowel syndrome)     Kidney stone     Multiple joint pain     SULEMA (obstructive sleep apnea)     NO MACHINE    Plantar fasciitis     RLS (restless legs syndrome)     Sciatica     Urge and stress incontinence        Past Surgical  History:   Past Surgical History:   Procedure Laterality Date    COLONOSCOPY      CYSTOSCOPY W/ LASER LITHOTRIPSY      EAR TUBES      ENDOSCOPY      GASTRIC SLEEVE LAPAROSCOPIC N/A 2/14/2018    Procedure: GASTRIC SLEEVE LAPAROSCOPIC;  Surgeon: Ej Coleman Jr., MD;  [facility] [place];  Service:     HYSTERECTOMY      LAPAROSCOPIC CHOLECYSTECTOMY      TONSILLECTOMY         Family History:   Family History   Problem Relation Age of Onset    Obesity Mother     Stroke Mother     Clotting disorder Mother     Asthma Mother     Hyperlipidemia Father     Diabetes Sister     Obesity Sister     Diabetes Brother     Breast cancer Neg Hx     Ovarian cancer Neg Hx     Malig Hyperthermia Neg Hx        Social History:   Social History     Socioeconomic History    Marital status:     Number of children: 3   Tobacco Use    Smoking status: Every Day     Current packs/day: 1.00     Average packs/day: 1 pack/day for 38.2 years (38.2 ttl pk-yrs)     Types: Cigarettes     Start date: 1986    Smokeless tobacco: Never   Vaping Use    Vaping status: Never Used   Substance and Sexual Activity    Alcohol use: No    Drug use: No    Sexual activity: Defer       Medications:     Current Outpatient Medications:     albuterol sulfate  (90 Base) MCG/ACT inhaler, Inhale 2 puffs Every 4 (Four) Hours As Needed for Wheezing., Disp: 18 g, Rfl: 1    atorvastatin (LIPITOR) 10 MG tablet, Take 1 tablet by mouth Daily., Disp: 30 tablet, Rfl: 1    famotidine (PEPCID) 40 MG tablet, Take 1 tablet by mouth At Night As Needed for Heartburn., Disp: 30 tablet, Rfl: 1    fluticasone (FLONASE) 50 MCG/ACT nasal spray, 2 sprays into the nostril(s) as directed by provider Daily., Disp: 16 mL, Rfl: 1    omeprazole (priLOSEC) 40 MG capsule, Take 1 capsule by mouth Daily., Disp: 30 capsule, Rfl: 1    amLODIPine (NORVASC) 5 MG tablet, Take 1 tablet by mouth Daily., Disp: 30 tablet, Rfl: 2    Allergies:   Allergies   Allergen Reactions    Penicillins  Other (See Comments)     seizures    Aspirin Other (See Comments)     HEARING TROUBLE    Celebrex [Celecoxib] Other (See Comments)     Flu like symptoms    Contrast Dye (Echo Or Unknown Ct/Mr) Nausea And Vomiting    Nsaids Other (See Comments)     Just not allowed to take       Objective     Physical Exam:  Vital Signs:   Vitals:    03/07/24 1430   BP: 140/76   Pulse: 86   SpO2: 96%   Weight: 72.6 kg (160 lb)     Body mass index is 26.63 kg/m².     Physical Exam  Vitals and nursing note reviewed.   Constitutional:       General: She is not in acute distress.     Appearance: Normal appearance. She is not ill-appearing.   HENT:      Head: Normocephalic and atraumatic.      Right Ear: Tympanic membrane and ear canal normal.      Left Ear: Tympanic membrane and ear canal normal.      Nose: Nose normal.   Cardiovascular:      Rate and Rhythm: Normal rate and regular rhythm.      Heart sounds: Normal heart sounds.   Pulmonary:      Effort: Pulmonary effort is normal.      Breath sounds: Normal breath sounds.   Neurological:      Mental Status: She is alert and oriented to person, place, and time. Mental status is at baseline.   Psychiatric:         Mood and Affect: Mood normal.         Procedures      Assessment / Plan      Assessment/Plan:   Diagnoses and all orders for this visit:    1. Benign essential HTN (Primary)  -     CBC Auto Differential  -     Comprehensive Metabolic Panel  -     Lipid Panel  -     TSH    2. Left lower quadrant abdominal pain  -     Cancel: Urinalysis With Microscopic - Urine, Clean Catch; Future    3. Multiple joint pain  -     POC Urine Drug Screen, Triage    4. Low back pain, unspecified back pain laterality, unspecified chronicity, unspecified whether sciatica present  -     Ambulatory Referral to Pain Management  -     Urine Culture - Urine, Urine, Clean Catch  -     POCT urinalysis dipstick, automated  -     POC Urine Drug Screen, Triage    5. Dysuria  -     Urine Culture - Urine, Urine,  Clean Catch  -     POCT urinalysis dipstick, automated    Other orders  -     amLODIPine (NORVASC) 5 MG tablet; Take 1 tablet by mouth Daily.  Dispense: 30 tablet; Refill: 2         Culture urine.  Does not appear to have infection.  No blood in urine so doubt kidney stone.  Checking blood work related to her hypertension.  Gave amlodipine by itself to try for blood pressure.  Made referral to pain management for further evaluation for her low back pain.  Unable to treat here.    Follow Up:   No follow-ups on file.    Ye Eaton  [facility]

## 2024-03-08 LAB
ALBUMIN SERPL-MCNC: 4.1 G/DL (ref 3.8–4.9)
ALBUMIN/GLOB SERPL: 1.7 {RATIO} (ref 1.2–2.2)
ALP SERPL-CCNC: 94 IU/L (ref 44–121)
ALT SERPL-CCNC: 12 IU/L (ref 0–32)
AST SERPL-CCNC: 13 IU/L (ref 0–40)
BASOPHILS # BLD AUTO: 0.1 X10E3/UL (ref 0–0.2)
BASOPHILS NFR BLD AUTO: 1 %
BILIRUB SERPL-MCNC: <0.2 MG/DL (ref 0–1.2)
BUN SERPL-MCNC: 16 MG/DL (ref 6–24)
BUN/CREAT SERPL: 22 (ref 9–23)
CALCIUM SERPL-MCNC: 9.7 MG/DL (ref 8.7–10.2)
CHLORIDE SERPL-SCNC: 106 MMOL/L (ref 96–106)
CHOLEST SERPL-MCNC: 162 MG/DL (ref 100–199)
CO2 SERPL-SCNC: 25 MMOL/L (ref 20–29)
CREAT SERPL-MCNC: 0.74 MG/DL (ref 0.57–1)
EGFRCR SERPLBLD CKD-EPI 2021: 97 ML/MIN/1.73
EOSINOPHIL # BLD AUTO: 0.2 X10E3/UL (ref 0–0.4)
EOSINOPHIL NFR BLD AUTO: 2 %
ERYTHROCYTE [DISTWIDTH] IN BLOOD BY AUTOMATED COUNT: 12.5 % (ref 11.7–15.4)
GLOBULIN SER CALC-MCNC: 2.4 G/DL (ref 1.5–4.5)
GLUCOSE SERPL-MCNC: 98 MG/DL (ref 70–99)
HCT VFR BLD AUTO: 41.8 % (ref 34–46.6)
HDLC SERPL-MCNC: 59 MG/DL
HGB BLD-MCNC: 13.7 G/DL (ref 11.1–15.9)
IMM GRANULOCYTES # BLD AUTO: 0 X10E3/UL (ref 0–0.1)
IMM GRANULOCYTES NFR BLD AUTO: 0 %
LDLC SERPL CALC-MCNC: 77 MG/DL (ref 0–99)
LYMPHOCYTES # BLD AUTO: 3.2 X10E3/UL (ref 0.7–3.1)
LYMPHOCYTES NFR BLD AUTO: 42 %
MCH RBC QN AUTO: 31.2 PG (ref 26.6–33)
MCHC RBC AUTO-ENTMCNC: 32.8 G/DL (ref 31.5–35.7)
MCV RBC AUTO: 95 FL (ref 79–97)
MONOCYTES # BLD AUTO: 0.5 X10E3/UL (ref 0.1–0.9)
MONOCYTES NFR BLD AUTO: 6 %
NEUTROPHILS # BLD AUTO: 3.7 X10E3/UL (ref 1.4–7)
NEUTROPHILS NFR BLD AUTO: 49 %
PLATELET # BLD AUTO: 256 X10E3/UL (ref 150–450)
POTASSIUM SERPL-SCNC: 4.3 MMOL/L (ref 3.5–5.2)
PROT SERPL-MCNC: 6.5 G/DL (ref 6–8.5)
RBC # BLD AUTO: 4.39 X10E6/UL (ref 3.77–5.28)
SODIUM SERPL-SCNC: 143 MMOL/L (ref 134–144)
TRIGL SERPL-MCNC: 155 MG/DL (ref 0–149)
TSH SERPL DL<=0.005 MIU/L-ACNC: 0.49 UIU/ML (ref 0.45–4.5)
VLDLC SERPL CALC-MCNC: 26 MG/DL (ref 5–40)
WBC # BLD AUTO: 7.6 X10E3/UL (ref 3.4–10.8)

## 2024-03-11 LAB
BACTERIA UR CULT: ABNORMAL
BACTERIA UR CULT: ABNORMAL
OTHER ANTIBIOTIC SUSC ISLT: ABNORMAL

## 2024-04-15 RX ORDER — FAMOTIDINE 40 MG/1
40 TABLET, FILM COATED ORAL NIGHTLY PRN
Qty: 30 TABLET | Refills: 2 | Status: SHIPPED | OUTPATIENT
Start: 2024-04-15

## 2024-08-19 DIAGNOSIS — I10 BENIGN ESSENTIAL HTN: ICD-10-CM

## 2024-08-19 DIAGNOSIS — K21.9 GASTROESOPHAGEAL REFLUX DISEASE WITHOUT ESOPHAGITIS: ICD-10-CM

## 2024-08-20 RX ORDER — OMEPRAZOLE 40 MG/1
40 CAPSULE, DELAYED RELEASE ORAL DAILY
Qty: 30 CAPSULE | Refills: 1 | Status: SHIPPED | OUTPATIENT
Start: 2024-08-20

## 2024-08-21 RX ORDER — ATORVASTATIN CALCIUM 10 MG/1
10 TABLET, FILM COATED ORAL DAILY
Qty: 90 TABLET | Refills: 1 | Status: SHIPPED | OUTPATIENT
Start: 2024-08-21

## 2024-08-22 DIAGNOSIS — I10 BENIGN ESSENTIAL HTN: ICD-10-CM

## 2024-10-01 DIAGNOSIS — I10 BENIGN ESSENTIAL HTN: ICD-10-CM

## 2024-10-01 RX ORDER — METOPROLOL TARTRATE 25 MG/1
25 TABLET, FILM COATED ORAL 2 TIMES DAILY
Qty: 60 TABLET | Refills: 1 | OUTPATIENT
Start: 2024-10-01

## 2024-11-02 DIAGNOSIS — K21.9 GASTROESOPHAGEAL REFLUX DISEASE WITHOUT ESOPHAGITIS: ICD-10-CM

## 2024-11-02 DIAGNOSIS — I10 BENIGN ESSENTIAL HTN: ICD-10-CM

## 2024-11-04 RX ORDER — AMLODIPINE AND BENAZEPRIL HYDROCHLORIDE 10; 40 MG/1; MG/1
1 CAPSULE ORAL DAILY
Qty: 30 CAPSULE | Refills: 1 | OUTPATIENT
Start: 2024-11-04

## 2024-11-04 RX ORDER — OMEPRAZOLE 40 MG/1
40 CAPSULE, DELAYED RELEASE ORAL DAILY
Qty: 30 CAPSULE | Refills: 1 | Status: SHIPPED | OUTPATIENT
Start: 2024-11-04

## 2024-12-07 DIAGNOSIS — I10 BENIGN ESSENTIAL HTN: ICD-10-CM

## 2024-12-11 RX ORDER — AMLODIPINE AND BENAZEPRIL HYDROCHLORIDE 10; 40 MG/1; MG/1
1 CAPSULE ORAL DAILY
Qty: 30 CAPSULE | Refills: 1 | OUTPATIENT
Start: 2024-12-11

## 2024-12-30 RX ORDER — ALBUTEROL SULFATE 90 UG/1
2 AEROSOL, METERED RESPIRATORY (INHALATION) EVERY 4 HOURS PRN
Qty: 18 G | Refills: 0 | Status: SHIPPED | OUTPATIENT
Start: 2024-12-30

## 2025-01-10 RX ORDER — FAMOTIDINE 40 MG/1
40 TABLET, FILM COATED ORAL NIGHTLY PRN
Qty: 30 TABLET | Refills: 2 | Status: SHIPPED | OUTPATIENT
Start: 2025-01-10

## 2025-01-17 DIAGNOSIS — K21.9 GASTROESOPHAGEAL REFLUX DISEASE WITHOUT ESOPHAGITIS: ICD-10-CM

## 2025-01-17 RX ORDER — OMEPRAZOLE 40 MG/1
40 CAPSULE, DELAYED RELEASE ORAL DAILY
Qty: 30 CAPSULE | Refills: 2 | Status: SHIPPED | OUTPATIENT
Start: 2025-01-17

## 2025-02-14 RX ORDER — ATORVASTATIN CALCIUM 10 MG/1
10 TABLET, FILM COATED ORAL DAILY
Qty: 30 TABLET | Refills: 1 | Status: SHIPPED | OUTPATIENT
Start: 2025-02-14

## 2025-03-21 ENCOUNTER — TELEPHONE (OUTPATIENT)
Dept: FAMILY MEDICINE CLINIC | Facility: CLINIC | Age: 55
End: 2025-03-21

## 2025-03-21 RX ORDER — ALBUTEROL SULFATE 90 UG/1
2 INHALANT RESPIRATORY (INHALATION) EVERY 4 HOURS PRN
Qty: 18 G | Refills: 0 | Status: SHIPPED | OUTPATIENT
Start: 2025-03-21

## 2025-03-21 NOTE — TELEPHONE ENCOUNTER
SPOKE WITH PATIENT. SHE HAD A WELLNESS EXAM SCHEDULED FOR TODAY 03/21/2025. DR QUINTERO WAS NOT IN THE OFFICE. SHE SAID SHE WOULD CALL BACK TO [facility]

## 2025-04-06 DIAGNOSIS — K21.9 GASTROESOPHAGEAL REFLUX DISEASE WITHOUT ESOPHAGITIS: ICD-10-CM

## 2025-04-07 RX ORDER — FAMOTIDINE 40 MG/1
40 TABLET, FILM COATED ORAL NIGHTLY PRN
Qty: 30 TABLET | Refills: 2 | OUTPATIENT
Start: 2025-04-07

## 2025-04-07 RX ORDER — OMEPRAZOLE 40 MG/1
40 CAPSULE, DELAYED RELEASE ORAL DAILY
Qty: 30 CAPSULE | Refills: 2 | OUTPATIENT
Start: 2025-04-07

## 2025-04-07 RX ORDER — ATORVASTATIN CALCIUM 10 MG/1
10 TABLET, FILM COATED ORAL DAILY
Qty: 30 TABLET | Refills: 0 | Status: SHIPPED | OUTPATIENT
Start: 2025-04-07

## 2025-04-07 RX ORDER — ALBUTEROL SULFATE 90 UG/1
2 AEROSOL, METERED RESPIRATORY (INHALATION)
OUTPATIENT
Start: 2025-04-07

## 2025-04-16 RX ORDER — FAMOTIDINE 40 MG/1
40 TABLET, FILM COATED ORAL NIGHTLY PRN
Qty: 30 TABLET | Refills: 2 | OUTPATIENT
Start: 2025-04-16

## 2025-04-22 DIAGNOSIS — K21.9 GASTROESOPHAGEAL REFLUX DISEASE WITHOUT ESOPHAGITIS: ICD-10-CM

## 2025-04-22 RX ORDER — OMEPRAZOLE 40 MG/1
40 CAPSULE, DELAYED RELEASE ORAL DAILY
Qty: 30 CAPSULE | Refills: 2 | OUTPATIENT
Start: 2025-04-22

## 2025-05-13 RX ORDER — ATORVASTATIN CALCIUM 10 MG/1
10 TABLET, FILM COATED ORAL DAILY
Qty: 30 TABLET | Refills: 0 | OUTPATIENT
Start: 2025-05-13

## 2025-05-14 NOTE — TELEPHONE ENCOUNTER
Called patient gave message scheduled appointment for 6/13/25 she said she is out of her albuterol and for a refill of that and said her ENT upped the does on her on omeprazole and she cannot swallow since increasing the dose please advise

## 2025-05-14 NOTE — TELEPHONE ENCOUNTER
Spoke to patient. She states that she needs a refill of her inhaler. She is now scheduled for a f/u on 06/13/25 but she needs it before then. Pt has not been seen since 03/2024. Please advise.       Also informed her to contact the ENT office about her Omeprazole.

## 2025-05-15 RX ORDER — ALBUTEROL SULFATE 90 UG/1
2 INHALANT RESPIRATORY (INHALATION) EVERY 4 HOURS PRN
Qty: 18 G | Refills: 1 | Status: SHIPPED | OUTPATIENT
Start: 2025-05-15

## 2025-05-15 NOTE — TELEPHONE ENCOUNTER
Sent the albuterol.  I would for sure talk to ENT about the other issue.  I would not be able to do much with that since I did not start the medication and I am not sure what he is trying to treat.

## 2025-05-21 RX ORDER — FAMOTIDINE 40 MG/1
40 TABLET, FILM COATED ORAL NIGHTLY PRN
Qty: 30 TABLET | Refills: 0 | Status: SHIPPED | OUTPATIENT
Start: 2025-05-21

## 2025-06-27 DIAGNOSIS — M54.50 LUMBAR BACK PAIN: Primary | ICD-10-CM

## 2025-07-31 RX ORDER — FAMOTIDINE 40 MG/1
40 TABLET, FILM COATED ORAL NIGHTLY PRN
Qty: 30 TABLET | Refills: 0 | OUTPATIENT
Start: 2025-07-31

## 2025-08-08 ENCOUNTER — OFFICE VISIT (OUTPATIENT)
Dept: NEUROSURGERY | Facility: CLINIC | Age: 55
End: 2025-08-08
Payer: COMMERCIAL

## 2025-08-08 VITALS — HEIGHT: 65 IN | WEIGHT: 169 LBS | BODY MASS INDEX: 28.16 KG/M2 | TEMPERATURE: 97.7 F

## 2025-08-08 DIAGNOSIS — Z72.0 TOBACCO USE: ICD-10-CM

## 2025-08-08 DIAGNOSIS — M43.10 ACQUIRED SPONDYLOLISTHESIS: Primary | ICD-10-CM

## 2025-08-08 RX ORDER — HYDROCODONE BITARTRATE AND ACETAMINOPHEN 10; 325 MG/1; MG/1
TABLET ORAL EVERY 6 HOURS SCHEDULED
COMMUNITY

## 2025-08-08 RX ORDER — AMLODIPINE AND BENAZEPRIL HYDROCHLORIDE 10; 40 MG/1; MG/1
1 CAPSULE ORAL DAILY
COMMUNITY

## 2025-08-08 RX ORDER — LIFITEGRAST 50 MG/ML
SOLUTION/ DROPS OPHTHALMIC
COMMUNITY

## 2025-08-08 RX ORDER — METOPROLOL TARTRATE 25 MG/1
25 TABLET, FILM COATED ORAL 2 TIMES DAILY
COMMUNITY

## 2025-08-08 RX ORDER — GABAPENTIN 100 MG/1
100 CAPSULE ORAL 3 TIMES DAILY
COMMUNITY

## (undated) DEVICE — SUT SILK 0 SH 30IN K834H

## (undated) DEVICE — APL DUPLOSPRAYER MIS 40CM

## (undated) DEVICE — GLV SURG SENSICARE MICRO PF LF 8.5 STRL

## (undated) DEVICE — VISIGI 3D®  CALIBRATION SYSTEM  SIZE 36FR STD W/ BULB: Brand: BOEHRINGER® VISIGI 3D™ SLEEVE GASTRECTOMY CALIBRATION SYSTEM, SIZE 36FR W/BULB

## (undated) DEVICE — PK OSC LAP SLV 40

## (undated) DEVICE — ADHS SKIN DERMABOND

## (undated) DEVICE — ENSEAL LAPAROSCOPIC TISSUE SEALER G2 ARTICULATING CURVED JAW FOR USE WITH G2 GENERATOR 5MM DIAMETER 45CM SHAFT LENGTH: Brand: ENSEAL

## (undated) DEVICE — GLV SURG SENSICARE GREEN W/ALOE PF LF 8.5 STRL

## (undated) DEVICE — GLV SURG SENSICARE GREEN W/ALOE PF LF 6 STRL

## (undated) DEVICE — GLV SURG SENSICARE MICRO PF LF 6 STRL